# Patient Record
Sex: MALE | Race: WHITE | NOT HISPANIC OR LATINO | URBAN - METROPOLITAN AREA
[De-identification: names, ages, dates, MRNs, and addresses within clinical notes are randomized per-mention and may not be internally consistent; named-entity substitution may affect disease eponyms.]

---

## 2017-03-30 ENCOUNTER — OUTPATIENT (OUTPATIENT)
Dept: OUTPATIENT SERVICES | Facility: HOSPITAL | Age: 77
LOS: 1 days | End: 2017-03-30
Payer: MEDICARE

## 2017-03-30 ENCOUNTER — APPOINTMENT (OUTPATIENT)
Dept: CT IMAGING | Facility: CLINIC | Age: 77
End: 2017-03-30

## 2017-03-30 ENCOUNTER — APPOINTMENT (OUTPATIENT)
Dept: MRI IMAGING | Facility: CLINIC | Age: 77
End: 2017-03-30

## 2017-03-30 DIAGNOSIS — Z00.8 ENCOUNTER FOR OTHER GENERAL EXAMINATION: ICD-10-CM

## 2017-03-30 PROCEDURE — 71250 CT THORAX DX C-: CPT

## 2017-03-30 PROCEDURE — 74181 MRI ABDOMEN W/O CONTRAST: CPT

## 2017-03-30 PROCEDURE — 72195 MRI PELVIS W/O DYE: CPT

## 2017-06-13 ENCOUNTER — APPOINTMENT (OUTPATIENT)
Dept: ELECTROPHYSIOLOGY | Facility: CLINIC | Age: 77
End: 2017-06-13

## 2017-06-13 ENCOUNTER — CHART COPY (OUTPATIENT)
Age: 77
End: 2017-06-13

## 2017-06-13 VITALS
HEIGHT: 68 IN | BODY MASS INDEX: 32.13 KG/M2 | SYSTOLIC BLOOD PRESSURE: 125 MMHG | WEIGHT: 212 LBS | HEART RATE: 86 BPM | DIASTOLIC BLOOD PRESSURE: 83 MMHG

## 2017-07-12 ENCOUNTER — CHART COPY (OUTPATIENT)
Age: 77
End: 2017-07-12

## 2017-12-19 ENCOUNTER — APPOINTMENT (OUTPATIENT)
Dept: ELECTROPHYSIOLOGY | Facility: CLINIC | Age: 77
End: 2017-12-19
Payer: MEDICARE

## 2017-12-19 VITALS
DIASTOLIC BLOOD PRESSURE: 74 MMHG | WEIGHT: 212 LBS | HEIGHT: 68 IN | SYSTOLIC BLOOD PRESSURE: 139 MMHG | HEART RATE: 67 BPM | BODY MASS INDEX: 32.13 KG/M2

## 2017-12-19 PROCEDURE — 93290 INTERROG DEV EVAL ICPMS IP: CPT

## 2017-12-19 RX ORDER — ENALAPRIL MALEATE 10 MG/1
10 TABLET ORAL
Refills: 0 | Status: DISCONTINUED | COMMUNITY
End: 2017-12-19

## 2018-03-26 ENCOUNTER — APPOINTMENT (OUTPATIENT)
Dept: ELECTROPHYSIOLOGY | Facility: CLINIC | Age: 78
End: 2018-03-26
Payer: MEDICARE

## 2018-03-26 PROCEDURE — 93299: CPT

## 2018-03-26 PROCEDURE — 93298 REM INTERROG DEV EVAL SCRMS: CPT

## 2018-05-29 ENCOUNTER — OTHER (OUTPATIENT)
Age: 78
End: 2018-05-29

## 2018-06-26 ENCOUNTER — APPOINTMENT (OUTPATIENT)
Dept: ELECTROPHYSIOLOGY | Facility: CLINIC | Age: 78
End: 2018-06-26
Payer: MEDICARE

## 2018-06-26 VITALS
BODY MASS INDEX: 33.34 KG/M2 | HEART RATE: 61 BPM | HEIGHT: 68 IN | DIASTOLIC BLOOD PRESSURE: 77 MMHG | SYSTOLIC BLOOD PRESSURE: 143 MMHG | WEIGHT: 220 LBS

## 2018-06-26 DIAGNOSIS — Z95.818 PRESENCE OF OTHER CARDIAC IMPLANTS AND GRAFTS: ICD-10-CM

## 2018-06-26 PROCEDURE — 93285 PRGRMG DEV EVAL SCRMS IP: CPT

## 2018-07-03 ENCOUNTER — OUTPATIENT (OUTPATIENT)
Dept: OUTPATIENT SERVICES | Facility: HOSPITAL | Age: 78
LOS: 1 days | Discharge: HOME | End: 2018-07-03

## 2018-07-03 DIAGNOSIS — C64.9 MALIGNANT NEOPLASM OF UNSPECIFIED KIDNEY, EXCEPT RENAL PELVIS: ICD-10-CM

## 2018-07-04 ENCOUNTER — FORM ENCOUNTER (OUTPATIENT)
Age: 78
End: 2018-07-04

## 2018-07-05 ENCOUNTER — OUTPATIENT (OUTPATIENT)
Dept: OUTPATIENT SERVICES | Facility: HOSPITAL | Age: 78
LOS: 1 days | End: 2018-07-05
Payer: MEDICARE

## 2018-07-05 ENCOUNTER — APPOINTMENT (OUTPATIENT)
Dept: CT IMAGING | Facility: CLINIC | Age: 78
End: 2018-07-05
Payer: MEDICARE

## 2018-07-05 DIAGNOSIS — C64.9 MALIGNANT NEOPLASM OF UNSPECIFIED KIDNEY, EXCEPT RENAL PELVIS: ICD-10-CM

## 2018-07-05 PROCEDURE — 74176 CT ABD & PELVIS W/O CONTRAST: CPT | Mod: 26

## 2018-07-05 PROCEDURE — 74176 CT ABD & PELVIS W/O CONTRAST: CPT

## 2018-07-05 PROCEDURE — 71250 CT THORAX DX C-: CPT

## 2018-07-05 PROCEDURE — 71250 CT THORAX DX C-: CPT | Mod: 26

## 2018-07-10 ENCOUNTER — APPOINTMENT (OUTPATIENT)
Dept: HEMATOLOGY ONCOLOGY | Facility: CLINIC | Age: 78
End: 2018-07-10
Payer: MEDICARE

## 2018-07-10 VITALS
WEIGHT: 227.05 LBS | RESPIRATION RATE: 17 BRPM | DIASTOLIC BLOOD PRESSURE: 84 MMHG | OXYGEN SATURATION: 98 % | TEMPERATURE: 97.8 F | HEART RATE: 70 BPM | SYSTOLIC BLOOD PRESSURE: 130 MMHG | BODY MASS INDEX: 34.53 KG/M2

## 2018-07-10 PROCEDURE — 99214 OFFICE O/P EST MOD 30 MIN: CPT

## 2018-07-10 RX ORDER — ROSUVASTATIN CALCIUM 5 MG/1
5 TABLET, FILM COATED ORAL
Refills: 0 | Status: ACTIVE | COMMUNITY

## 2019-07-23 ENCOUNTER — FORM ENCOUNTER (OUTPATIENT)
Age: 79
End: 2019-07-23

## 2019-07-24 ENCOUNTER — OUTPATIENT (OUTPATIENT)
Dept: OUTPATIENT SERVICES | Facility: HOSPITAL | Age: 79
LOS: 1 days | End: 2019-07-24
Payer: MEDICARE

## 2019-07-24 ENCOUNTER — APPOINTMENT (OUTPATIENT)
Dept: CT IMAGING | Facility: CLINIC | Age: 79
End: 2019-07-24
Payer: MEDICARE

## 2019-07-24 DIAGNOSIS — Z00.8 ENCOUNTER FOR OTHER GENERAL EXAMINATION: ICD-10-CM

## 2019-07-24 PROCEDURE — 71250 CT THORAX DX C-: CPT

## 2019-07-24 PROCEDURE — 74176 CT ABD & PELVIS W/O CONTRAST: CPT | Mod: 26

## 2019-07-24 PROCEDURE — 74176 CT ABD & PELVIS W/O CONTRAST: CPT

## 2019-07-24 PROCEDURE — 71250 CT THORAX DX C-: CPT | Mod: 26

## 2019-07-25 NOTE — CONSULT LETTER
[Dear  ___] : Dear  [unfilled], [Courtesy Letter:] : I had the pleasure of seeing your patient, [unfilled], in my office today. [Please see my note below.] : Please see my note below. [Consult Closing:] : Thank you very much for allowing me to participate in the care of this patient.  If you have any questions, please do not hesitate to contact me. [Sincerely,] : Sincerely, [DrMiranda  ___] : Dr. GRIMES [DrMiranda ___] : Dr. GRIMES

## 2019-07-26 ENCOUNTER — OUTPATIENT (OUTPATIENT)
Dept: OUTPATIENT SERVICES | Facility: HOSPITAL | Age: 79
LOS: 1 days | Discharge: HOME | End: 2019-07-26

## 2019-07-26 DIAGNOSIS — C64.9 MALIGNANT NEOPLASM OF UNSPECIFIED KIDNEY, EXCEPT RENAL PELVIS: ICD-10-CM

## 2019-07-30 ENCOUNTER — APPOINTMENT (OUTPATIENT)
Dept: HEMATOLOGY ONCOLOGY | Facility: CLINIC | Age: 79
End: 2019-07-30
Payer: MEDICARE

## 2019-07-30 VITALS
OXYGEN SATURATION: 98 % | HEART RATE: 73 BPM | TEMPERATURE: 97.8 F | RESPIRATION RATE: 16 BRPM | DIASTOLIC BLOOD PRESSURE: 92 MMHG | SYSTOLIC BLOOD PRESSURE: 161 MMHG | BODY MASS INDEX: 34.93 KG/M2 | WEIGHT: 229.72 LBS

## 2019-07-30 PROCEDURE — 99214 OFFICE O/P EST MOD 30 MIN: CPT

## 2019-07-30 NOTE — REVIEW OF SYSTEMS
[Fatigue] : fatigue [Lower Ext Edema] : lower extremity edema [Joint Pain] : joint pain [Easy Bruising] : a tendency for easy bruising [Negative] : Gastrointestinal [Chills] : no chills [Night Sweats] : no night sweats [Fever] : no fever [Recent Change In Weight] : ~T no recent weight change [Chest Pain] : no chest pain [Cough] : no cough [Palpitations] : no palpitations [SOB on Exertion] : no shortness of breath during exertion [Incontinence] : no incontinence [Dysuria] : no dysuria [Muscle Pain] : no muscle pain [Dizziness] : no dizziness [Skin Rash] : no skin rash [Anxiety] : no anxiety [Depression] : no depression [Easy Bleeding] : no tendency for easy bleeding [FreeTextEntry5] : occ edema, minor [Muscle Weakness] : no muscle weakness [FreeTextEntry9] : hands [FreeTextEntry8] : flow is good, nocturia x 1 [de-identified] : No HA, no paresthesias

## 2019-07-30 NOTE — HISTORY OF PRESENT ILLNESS
[Disease: _____________________] : Disease: [unfilled] [T: ___] : T[unfilled] [N: ___] : N[unfilled] [M: ___] : M[unfilled] [AJCC Stage: ____] : AJCC Stage: [unfilled] [de-identified] : FG 2, clear cell [de-identified] : First seen by me October of 2013, this 74 year old man presented with gross hematuria and then underwent a CT scan.  He had a 25-pound weight loss before the diagnosis and he was found to be anemic.  A renal mass was found on the right side and he underwent resection complicated by a postoperative bleeding episode. This was a pathologic T3NXM0 tumor.  He was hospitalized from 08/27 via the emergency room until 09/17.  He was transfused 7 units of blood and he went back to the operating room on two occasions for removal of packing.  The pathology report is dated 08/29/13.  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  Tumor extended into the renal sinus fat and the adrenal gland was negative.  Margins were negative for invasive carcinoma.  No lymph nodes were submitted.  The tumor was pathologic T3aN0 and presumed M0.  \par \par 2/12/16....Feels well, no fatigue. Occasional pains in right side, present since the surgery. Notes a bulge in the area. Appetite is good, no weight loss. No dyspnea, cough/sputum. No blood in urine. \par \par 8/24/16...Feels well. Still  has some discomfort in the right flank area, present since the surgery. Intermittent tightness and uncomfortable sensation, mostly noted when he lies on his right side. Appetite good, no weight loss. No bone pains. No N/V/D/C. No respiratory issues. Has sleep apnea, uses CPAP. \par \par 7/10/18...Not seen in almost 2 years. Had recent CT. Feels well. No pains noted. Has chronic discomfort in the surgical area. Has some arthritic and intermittent low back pains for a few years. Appetite is normal, weight stable. No headaches, no dizziness, no balance issues, no falls. No visual disturbances. No leg edema except for small amount in AM. No bone pains. No cough. Some dyspnea when he bends over. Not with exertion.  [de-identified] : Here for follow up for T3 RCC 2013. Fells well. No headaches, no dizziness, no balance issues. Appetite is good, no weight loss. Minor fatigue. No hematuria. Has some muscle discomfort after the surgery. Some finer arthritis, no bone pains.

## 2019-07-30 NOTE — ASSESSMENT
[Palliative Care Plan] : not applicable at this time [FreeTextEntry1] : Mr. Dawson is seen in followup today. I first saw him in October of 2013. He was diagnosed with a T3 clear cell carcinoma at that time and underwent resection. He has not had any evidence of metastases over time, and he continues on surveillance. He feels well. There are no headaches or dizziness. There are no balance issues. His appetite is good and there's no weight loss. He has some minor fatigue. There is no hematuria. There are no bone pains.\par \par On physical examination, he appears well. There were no abnormal findings detected.\par \par CAT scan performed last week and went over the results. There is no evidence of metastases or recurrence. He is now almost 6 years from his diagnosis. We discussed recommended followup. Imaging studies are generally performed every year for 5 years or so, and then only performed based on symptoms and suspicions. We discussed the balance of discovering some recurrent early versus the potential adverse effects of radiation therapy from scans.\par \par I have asked to return to see me in one year's time, but I will not likely order a scan before his visit. All questions are answered the best of my ability and to his apparent satisfaction.

## 2019-10-07 VITALS — BODY MASS INDEX: 35.24 KG/M2 | WEIGHT: 225 LBS | SYSTOLIC BLOOD PRESSURE: 120 MMHG | DIASTOLIC BLOOD PRESSURE: 68 MMHG

## 2020-01-14 NOTE — RESULTS/DATA
[FreeTextEntry1] : EXAM: CT CHEST \par EXAM: CT ABDOMEN AND PELVIS \par PROCEDURE DATE: 07/24/2019 \par INTERPRETATION: CLINICAL INFORMATION: Renal cell cancer \par COMPARISON: Prior CT scan of the chest, abdomen and pelvis from 7/5/2018 \par PROCEDURE: \par CT of the Chest, Abdomen and Pelvis was performed without intravenous contrast. Intravenous contrast: None. \par Oral contrast: Positive contrast was administered. Sagittal and coronal reformats were performed. \par FINDINGS: \par CHEST: \par LUNGS AND LARGE AIRWAYS: Patent central airways. No pulmonary nodules. There is a 4 mm calcified granuloma in the right middle lobe. Mild atelectatic \par changes are present at the lung bases. \par PLEURA: No pleural effusion. \par VESSELS: Within normal limits. \par HEART: Heart size is normal. No pericardial effusion. There are vascular calcifications in the coronary arteries. \par MEDIASTINUM AND CHRISTA: No lymphadenopathy. \par CHEST WALL AND LOWER NECK: Within normal limits. \par ABDOMEN AND PELVIS: \par LIVER: Within normal limits. \par BILE DUCTS: Normal caliber. \par GALLBLADDER: There is a 5 mm stone in the gallbladder without gross \par inflammation. \par SPLEEN: Within normal limits. \par PANCREAS: Within normal limits. \par ADRENALS: There is a 1.3 x 1.1 cm left adrenal nodule measuring 30 Hounsfield units. This is nondiagnostic and has not significantly changed \par when compared with the prior study when measured in a similar fashion. KIDNEYS/URETERS: Patient is status post right-sided nephrectomy with \par postsurgical changes. No abnormal soft tissue is appreciated in the surgical bed. \par BLADDER: Within normal limits. \par REPRODUCTIVE ORGANS: Prostate gland is prominent in size. Please correlate clinically. \par BOWEL: No bowel obstruction. Appendix is not well visualized. No inflammatory changes are noted in the right lower quadrant. \par PERITONEUM: No ascites. \par VESSELS: Vascular calcifications. \par RETROPERITONEUM: No lymphadenopathy. \par ABDOMINAL WALL: There is a right inguinal hernia containing fat. There are surgical clips in the left inguinal region which may be due to prior hernia \par repair. Please correlate clinically. \par BONES: Mild degenerative changes of bone are present. \par IMPRESSION: \par Status post right nephrectomy with postsurgical changes for history of renal cell carcinoma. No gross evidence for recurrent or metastatic disease. \par 1.5 x 1.1 cm left adrenal nodule which is nondiagnostic and stable compared with the prior study when measured in a similar fashion. This is also stable \par when compared with older examination from 1/28/2015. \par Prominent prostate gland. Please correlate clinically. Right inguinal hernia containing fat. Additional findings as above. \par FELIX LEROY M.D., ATTENDING RADIOLOGIST \par This document has been electronically signed. Jul 24 2019 11:46AM  hair removal not indicated

## 2020-06-01 ENCOUNTER — OUTPATIENT (OUTPATIENT)
Dept: OUTPATIENT SERVICES | Facility: HOSPITAL | Age: 80
LOS: 1 days | Discharge: HOME | End: 2020-06-01

## 2020-06-01 DIAGNOSIS — C64.9 MALIGNANT NEOPLASM OF UNSPECIFIED KIDNEY, EXCEPT RENAL PELVIS: ICD-10-CM

## 2020-06-04 ENCOUNTER — APPOINTMENT (OUTPATIENT)
Dept: HEMATOLOGY ONCOLOGY | Facility: CLINIC | Age: 80
End: 2020-06-04
Payer: MEDICARE

## 2020-06-04 PROCEDURE — 99213 OFFICE O/P EST LOW 20 MIN: CPT | Mod: 95

## 2020-06-04 NOTE — ASSESSMENT
[Palliative Care Plan] : not applicable at this time [FreeTextEntry1] : Mr. Douglas is seen in the office in followup. He was first seen by me in October of 2013. A 25 pound weight loss before the diagnosis of a renal tumor he was anemic as well. At a right-sided renal mass which was 13 cm in size. He had postoperative bleeding episode. This was a T3a tumor. He had a clear-cell conventional renal cell carcinoma, Tanesha grade 2 tumor. A liver biopsy was done at that time of a lesion which revealed a bile duct hamartoma.\par \par He states he feels well. His appetite is good and his no weight loss. He has some minor arthritic complaints. There is no cough or shortness of breath. There are no headaches, balance issues, or dizziness. He has some mild fatigue. He is out and walking around the neighborhood. There are no major urinary issues. There is no hematuria.\par \par He appears well and he is independent and all of his activities of daily living.\par \par His last imaging studies were in July of 2019. He is now 7 years from the resection of his tumor which is a high risk tumor given the T3a status. I have made arrangements for him to have repeat imaging done at this time.\par \par All questions were answered to the best of my ability to his apparent satisfaction. I suggested that we meet once again in 6 months time.

## 2020-06-04 NOTE — REVIEW OF SYSTEMS
[Fatigue] : fatigue [Joint Pain] : joint pain [Easy Bruising] : a tendency for easy bruising [Negative] : Cardiovascular [Fever] : no fever [Recent Change In Weight] : ~T no recent weight change [Chest Pain] : no chest pain [Chills] : no chills [Lower Ext Edema] : no lower extremity edema [Palpitations] : no palpitations [Shortness Of Breath] : no shortness of breath [Cough] : no cough [SOB on Exertion] : no shortness of breath during exertion [Constipation] : no constipation [Abdominal Pain] : no abdominal pain [Diarrhea] : no diarrhea [Dysuria] : no dysuria [Joint Stiffness] : no joint stiffness [Dizziness] : no dizziness [Skin Rash] : no skin rash [Anxiety] : no anxiety [Depression] : no depression [Muscle Weakness] : no muscle weakness [Easy Bleeding] : no tendency for easy bleeding [FreeTextEntry9] : arthritis in fingers, no cramps [de-identified] : No HA, no paresthesias, no balance issues

## 2020-06-04 NOTE — HISTORY OF PRESENT ILLNESS
[Disease: _____________________] : Disease: [unfilled] [T: ___] : T[unfilled] [N: ___] : N[unfilled] [M: ___] : M[unfilled] [AJCC Stage: ____] : AJCC Stage: [unfilled] [Home] : at home, [unfilled] , at the time of the visit. [Medical Office: ([facility])___] : at the medical office located in  [Verbal consent obtained from patient] : the patient, [unfilled] [de-identified] : FG 2, clear cell [de-identified] : First seen by me October of 2013, this 74 year old man presented with gross hematuria and then underwent a CT scan.  He had a 25-pound weight loss before the diagnosis and he was found to be anemic.  A renal mass was found on the right side and he underwent resection complicated by a postoperative bleeding episode. This was a pathologic T3NXM0 tumor.  He was hospitalized from 08/27 via the emergency room until 09/17.  He was transfused 7 units of blood and he went back to the operating room on two occasions for removal of packing.  The pathology report is dated 08/29/13.  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  Tumor extended into the renal sinus fat and the adrenal gland was negative.  Margins were negative for invasive carcinoma.  No lymph nodes were submitted.  The tumor was pathologic T3aN0 and presumed M0.  \par \par 2/12/16....Feels well, no fatigue. Occasional pains in right side, present since the surgery. Notes a bulge in the area. Appetite is good, no weight loss. No dyspnea, cough/sputum. No blood in urine. \par \par 8/24/16...Feels well. Still  has some discomfort in the right flank area, present since the surgery. Intermittent tightness and uncomfortable sensation, mostly noted when he lies on his right side. Appetite good, no weight loss. No bone pains. No N/V/D/C. No respiratory issues. Has sleep apnea, uses CPAP. \par \par 7/10/18...Not seen in almost 2 years. Had recent CT. Feels well. No pains noted. Has chronic discomfort in the surgical area. Has some arthritic and intermittent low back pains for a few years. Appetite is normal, weight stable. No headaches, no dizziness, no balance issues, no falls. No visual disturbances. No leg edema except for small amount in AM. No bone pains. No cough. Some dyspnea when he bends over. Not with exertion. \par \par 7/30/19...Here for follow up for T3 RCC 2013. Fells well. No headaches, no dizziness, no balance issues. Appetite is good, no weight loss. Minor fatigue. No hematuria. Has some muscle discomfort after the surgery. Some finer arthritis, no bone pains.  [de-identified] : Verbal permission granted for telehealth services by the patient, Micky Douglas, on 6/4/20  at 10:24 AM. \par Feels well, follow up for RCC. Appetite is good, no weight loss. Minor arthritic complaints. No major pains. No cough, No SOB. No F/C. No headaches, no balance issues, no dizziness. Fatigue is mild. Walks around the neighborhood. Urine flow is good. NOcturia x 1. No blood in urine. No edema.

## 2020-06-10 LAB
ALBUMIN SERPL ELPH-MCNC: 4.4 G/DL
ALP BLD-CCNC: 60 U/L
ALT SERPL-CCNC: 17 U/L
ANION GAP SERPL CALC-SCNC: 12 MMOL/L
AST SERPL-CCNC: 28 U/L
BASOPHILS # BLD AUTO: 0.08 K/UL
BASOPHILS NFR BLD AUTO: 1 %
BILIRUB SERPL-MCNC: 0.4 MG/DL
BUN SERPL-MCNC: 27 MG/DL
CALCIUM SERPL-MCNC: 9.5 MG/DL
CHLORIDE SERPL-SCNC: 107 MMOL/L
CO2 SERPL-SCNC: 23 MMOL/L
CREAT SERPL-MCNC: 1.73 MG/DL
EOSINOPHIL # BLD AUTO: 0.4 K/UL
EOSINOPHIL NFR BLD AUTO: 4.9 %
GLUCOSE SERPL-MCNC: 81 MG/DL
HCT VFR BLD CALC: 50 %
HGB BLD-MCNC: 15.5 G/DL
IMM GRANULOCYTES NFR BLD AUTO: 0.6 %
LYMPHOCYTES # BLD AUTO: 1.76 K/UL
LYMPHOCYTES NFR BLD AUTO: 21.5 %
MAN DIFF?: NORMAL
MCHC RBC-ENTMCNC: 29.5 PG
MCHC RBC-ENTMCNC: 31 GM/DL
MCV RBC AUTO: 95.1 FL
MONOCYTES # BLD AUTO: 0.85 K/UL
MONOCYTES NFR BLD AUTO: 10.4 %
NEUTROPHILS # BLD AUTO: 5.05 K/UL
NEUTROPHILS NFR BLD AUTO: 61.6 %
PLATELET # BLD AUTO: 204 K/UL
POTASSIUM SERPL-SCNC: 4.6 MMOL/L
PROT SERPL-MCNC: 6.7 G/DL
RBC # BLD: 5.26 M/UL
RBC # FLD: 13.8 %
SODIUM SERPL-SCNC: 143 MMOL/L
WBC # FLD AUTO: 8.19 K/UL

## 2020-06-12 ENCOUNTER — APPOINTMENT (OUTPATIENT)
Dept: CT IMAGING | Facility: CLINIC | Age: 80
End: 2020-06-12
Payer: MEDICARE

## 2020-06-12 ENCOUNTER — OUTPATIENT (OUTPATIENT)
Dept: OUTPATIENT SERVICES | Facility: HOSPITAL | Age: 80
LOS: 1 days | End: 2020-06-12
Payer: MEDICARE

## 2020-06-12 DIAGNOSIS — Z00.8 ENCOUNTER FOR OTHER GENERAL EXAMINATION: ICD-10-CM

## 2020-06-12 DIAGNOSIS — C64.9 MALIGNANT NEOPLASM OF UNSPECIFIED KIDNEY, EXCEPT RENAL PELVIS: ICD-10-CM

## 2020-06-12 PROCEDURE — 71250 CT THORAX DX C-: CPT | Mod: 26

## 2020-06-12 PROCEDURE — 74176 CT ABD & PELVIS W/O CONTRAST: CPT | Mod: 26

## 2020-06-12 PROCEDURE — 71250 CT THORAX DX C-: CPT

## 2020-06-12 PROCEDURE — 74176 CT ABD & PELVIS W/O CONTRAST: CPT

## 2021-03-29 ENCOUNTER — TRANSCRIPTION ENCOUNTER (OUTPATIENT)
Age: 81
End: 2021-03-29

## 2021-03-29 ENCOUNTER — EMERGENCY (EMERGENCY)
Facility: HOSPITAL | Age: 81
LOS: 0 days | Discharge: HOME | End: 2021-03-29
Attending: EMERGENCY MEDICINE | Admitting: INTERNAL MEDICINE
Payer: MEDICARE

## 2021-03-29 VITALS
OXYGEN SATURATION: 97 % | DIASTOLIC BLOOD PRESSURE: 74 MMHG | HEART RATE: 72 BPM | RESPIRATION RATE: 18 BRPM | SYSTOLIC BLOOD PRESSURE: 145 MMHG

## 2021-03-29 VITALS
OXYGEN SATURATION: 96 % | HEIGHT: 68 IN | DIASTOLIC BLOOD PRESSURE: 83 MMHG | HEART RATE: 61 BPM | TEMPERATURE: 97 F | WEIGHT: 229.94 LBS | SYSTOLIC BLOOD PRESSURE: 181 MMHG

## 2021-03-29 DIAGNOSIS — N40.0 BENIGN PROSTATIC HYPERPLASIA WITHOUT LOWER URINARY TRACT SYMPTOMS: ICD-10-CM

## 2021-03-29 DIAGNOSIS — R07.9 CHEST PAIN, UNSPECIFIED: ICD-10-CM

## 2021-03-29 DIAGNOSIS — Z91.030 BEE ALLERGY STATUS: ICD-10-CM

## 2021-03-29 DIAGNOSIS — R07.89 OTHER CHEST PAIN: ICD-10-CM

## 2021-03-29 DIAGNOSIS — R10.13 EPIGASTRIC PAIN: ICD-10-CM

## 2021-03-29 DIAGNOSIS — E29.1 TESTICULAR HYPOFUNCTION: ICD-10-CM

## 2021-03-29 DIAGNOSIS — N18.30 CHRONIC KIDNEY DISEASE, STAGE 3 UNSPECIFIED: ICD-10-CM

## 2021-03-29 DIAGNOSIS — R60.9 EDEMA, UNSPECIFIED: ICD-10-CM

## 2021-03-29 DIAGNOSIS — K21.9 GASTRO-ESOPHAGEAL REFLUX DISEASE WITHOUT ESOPHAGITIS: ICD-10-CM

## 2021-03-29 DIAGNOSIS — I44.30 UNSPECIFIED ATRIOVENTRICULAR BLOCK: ICD-10-CM

## 2021-03-29 DIAGNOSIS — E78.5 HYPERLIPIDEMIA, UNSPECIFIED: ICD-10-CM

## 2021-03-29 DIAGNOSIS — Z82.49 FAMILY HISTORY OF ISCHEMIC HEART DISEASE AND OTHER DISEASES OF THE CIRCULATORY SYSTEM: ICD-10-CM

## 2021-03-29 DIAGNOSIS — Z90.5 ACQUIRED ABSENCE OF KIDNEY: ICD-10-CM

## 2021-03-29 DIAGNOSIS — I12.9 HYPERTENSIVE CHRONIC KIDNEY DISEASE WITH STAGE 1 THROUGH STAGE 4 CHRONIC KIDNEY DISEASE, OR UNSPECIFIED CHRONIC KIDNEY DISEASE: ICD-10-CM

## 2021-03-29 DIAGNOSIS — Z86.73 PERSONAL HISTORY OF TRANSIENT ISCHEMIC ATTACK (TIA), AND CEREBRAL INFARCTION WITHOUT RESIDUAL DEFICITS: ICD-10-CM

## 2021-03-29 LAB
ALBUMIN SERPL ELPH-MCNC: 4 G/DL — SIGNIFICANT CHANGE UP (ref 3.3–5)
ALP SERPL-CCNC: 67 U/L — SIGNIFICANT CHANGE UP (ref 40–120)
ALT FLD-CCNC: 19 U/L — SIGNIFICANT CHANGE UP (ref 12–78)
ANION GAP SERPL CALC-SCNC: 4 MMOL/L — LOW (ref 5–17)
APTT BLD: 38.4 SEC — HIGH (ref 27.5–35.5)
AST SERPL-CCNC: 15 U/L — SIGNIFICANT CHANGE UP (ref 15–37)
BASOPHILS # BLD AUTO: 0.07 K/UL — SIGNIFICANT CHANGE UP (ref 0–0.2)
BASOPHILS NFR BLD AUTO: 0.7 % — SIGNIFICANT CHANGE UP (ref 0–2)
BILIRUB SERPL-MCNC: 0.5 MG/DL — SIGNIFICANT CHANGE UP (ref 0.2–1.2)
BUN SERPL-MCNC: 30 MG/DL — HIGH (ref 7–23)
CALCIUM SERPL-MCNC: 10.1 MG/DL — SIGNIFICANT CHANGE UP (ref 8.5–10.1)
CHLORIDE SERPL-SCNC: 108 MMOL/L — SIGNIFICANT CHANGE UP (ref 96–108)
CO2 SERPL-SCNC: 28 MMOL/L — SIGNIFICANT CHANGE UP (ref 22–31)
CREAT SERPL-MCNC: 2.12 MG/DL — HIGH (ref 0.5–1.3)
EOSINOPHIL # BLD AUTO: 0.29 K/UL — SIGNIFICANT CHANGE UP (ref 0–0.5)
EOSINOPHIL NFR BLD AUTO: 2.8 % — SIGNIFICANT CHANGE UP (ref 0–6)
GLUCOSE SERPL-MCNC: 119 MG/DL — HIGH (ref 70–99)
HCT VFR BLD CALC: 49.3 % — SIGNIFICANT CHANGE UP (ref 39–50)
HGB BLD-MCNC: 15.8 G/DL — SIGNIFICANT CHANGE UP (ref 13–17)
IMM GRANULOCYTES NFR BLD AUTO: 0.6 % — SIGNIFICANT CHANGE UP (ref 0–1.5)
INR BLD: 1 RATIO — SIGNIFICANT CHANGE UP (ref 0.88–1.16)
LYMPHOCYTES # BLD AUTO: 1.32 K/UL — SIGNIFICANT CHANGE UP (ref 1–3.3)
LYMPHOCYTES # BLD AUTO: 12.9 % — LOW (ref 13–44)
MAGNESIUM SERPL-MCNC: 2.6 MG/DL — SIGNIFICANT CHANGE UP (ref 1.6–2.6)
MCHC RBC-ENTMCNC: 29.3 PG — SIGNIFICANT CHANGE UP (ref 27–34)
MCHC RBC-ENTMCNC: 32 GM/DL — SIGNIFICANT CHANGE UP (ref 32–36)
MCV RBC AUTO: 91.3 FL — SIGNIFICANT CHANGE UP (ref 80–100)
MONOCYTES # BLD AUTO: 0.95 K/UL — HIGH (ref 0–0.9)
MONOCYTES NFR BLD AUTO: 9.3 % — SIGNIFICANT CHANGE UP (ref 2–14)
NEUTROPHILS # BLD AUTO: 7.53 K/UL — HIGH (ref 1.8–7.4)
NEUTROPHILS NFR BLD AUTO: 73.7 % — SIGNIFICANT CHANGE UP (ref 43–77)
NT-PROBNP SERPL-SCNC: 52 PG/ML — SIGNIFICANT CHANGE UP (ref 0–450)
PLATELET # BLD AUTO: 218 K/UL — SIGNIFICANT CHANGE UP (ref 150–400)
POTASSIUM SERPL-MCNC: 4.3 MMOL/L — SIGNIFICANT CHANGE UP (ref 3.5–5.3)
POTASSIUM SERPL-SCNC: 4.3 MMOL/L — SIGNIFICANT CHANGE UP (ref 3.5–5.3)
PROT SERPL-MCNC: 7.7 GM/DL — SIGNIFICANT CHANGE UP (ref 6–8.3)
PROTHROM AB SERPL-ACNC: 11.7 SEC — SIGNIFICANT CHANGE UP (ref 10.6–13.6)
RAPID RVP RESULT: SIGNIFICANT CHANGE UP
RBC # BLD: 5.4 M/UL — SIGNIFICANT CHANGE UP (ref 4.2–5.8)
RBC # FLD: 13.9 % — SIGNIFICANT CHANGE UP (ref 10.3–14.5)
SARS-COV-2 RNA SPEC QL NAA+PROBE: SIGNIFICANT CHANGE UP
SODIUM SERPL-SCNC: 140 MMOL/L — SIGNIFICANT CHANGE UP (ref 135–145)
TROPONIN I SERPL-MCNC: <0.015 NG/ML — SIGNIFICANT CHANGE UP (ref 0.01–0.04)
TROPONIN I SERPL-MCNC: <0.015 NG/ML — SIGNIFICANT CHANGE UP (ref 0.01–0.04)
WBC # BLD: 10.22 K/UL — SIGNIFICANT CHANGE UP (ref 3.8–10.5)
WBC # FLD AUTO: 10.22 K/UL — SIGNIFICANT CHANGE UP (ref 3.8–10.5)

## 2021-03-29 PROCEDURE — 83735 ASSAY OF MAGNESIUM: CPT

## 2021-03-29 PROCEDURE — 96374 THER/PROPH/DIAG INJ IV PUSH: CPT

## 2021-03-29 PROCEDURE — 71045 X-RAY EXAM CHEST 1 VIEW: CPT

## 2021-03-29 PROCEDURE — 80053 COMPREHEN METABOLIC PANEL: CPT

## 2021-03-29 PROCEDURE — 85730 THROMBOPLASTIN TIME PARTIAL: CPT

## 2021-03-29 PROCEDURE — 36415 COLL VENOUS BLD VENIPUNCTURE: CPT

## 2021-03-29 PROCEDURE — 0225U NFCT DS DNA&RNA 21 SARSCOV2: CPT

## 2021-03-29 PROCEDURE — 85025 COMPLETE CBC W/AUTO DIFF WBC: CPT

## 2021-03-29 PROCEDURE — 84484 ASSAY OF TROPONIN QUANT: CPT

## 2021-03-29 PROCEDURE — 86850 RBC ANTIBODY SCREEN: CPT

## 2021-03-29 PROCEDURE — 85610 PROTHROMBIN TIME: CPT

## 2021-03-29 PROCEDURE — 83880 ASSAY OF NATRIURETIC PEPTIDE: CPT

## 2021-03-29 PROCEDURE — 99285 EMERGENCY DEPT VISIT HI MDM: CPT | Mod: 25

## 2021-03-29 PROCEDURE — 93005 ELECTROCARDIOGRAM TRACING: CPT

## 2021-03-29 PROCEDURE — 86900 BLOOD TYPING SEROLOGIC ABO: CPT

## 2021-03-29 PROCEDURE — 86901 BLOOD TYPING SEROLOGIC RH(D): CPT

## 2021-03-29 RX ORDER — FAMOTIDINE 10 MG/ML
20 INJECTION INTRAVENOUS ONCE
Refills: 0 | Status: COMPLETED | OUTPATIENT
Start: 2021-03-29 | End: 2021-03-29

## 2021-03-29 RX ORDER — LIDOCAINE 4 G/100G
5 CREAM TOPICAL ONCE
Refills: 0 | Status: COMPLETED | OUTPATIENT
Start: 2021-03-29 | End: 2021-03-29

## 2021-03-29 RX ORDER — PANTOPRAZOLE SODIUM 20 MG/1
40 TABLET, DELAYED RELEASE ORAL
Refills: 0 | Status: DISCONTINUED | OUTPATIENT
Start: 2021-03-29 | End: 2021-03-29

## 2021-03-29 RX ORDER — LOSARTAN POTASSIUM 100 MG/1
50 TABLET, FILM COATED ORAL ONCE
Refills: 0 | Status: DISCONTINUED | OUTPATIENT
Start: 2021-03-29 | End: 2021-03-29

## 2021-03-29 RX ORDER — ASPIRIN/CALCIUM CARB/MAGNESIUM 324 MG
325 TABLET ORAL ONCE
Refills: 0 | Status: DISCONTINUED | OUTPATIENT
Start: 2021-03-29 | End: 2021-03-29

## 2021-03-29 RX ORDER — SODIUM CHLORIDE 9 MG/ML
1000 INJECTION INTRAMUSCULAR; INTRAVENOUS; SUBCUTANEOUS ONCE
Refills: 0 | Status: COMPLETED | OUTPATIENT
Start: 2021-03-29 | End: 2021-03-29

## 2021-03-29 RX ADMIN — Medication 30 MILLILITER(S): at 06:05

## 2021-03-29 RX ADMIN — FAMOTIDINE 20 MILLIGRAM(S): 10 INJECTION INTRAVENOUS at 06:06

## 2021-03-29 RX ADMIN — LIDOCAINE 5 MILLILITER(S): 4 CREAM TOPICAL at 06:05

## 2021-03-29 RX ADMIN — PANTOPRAZOLE SODIUM 40 MILLIGRAM(S): 20 TABLET, DELAYED RELEASE ORAL at 10:28

## 2021-03-29 RX ADMIN — SODIUM CHLORIDE 1000 MILLILITER(S): 9 INJECTION INTRAMUSCULAR; INTRAVENOUS; SUBCUTANEOUS at 07:13

## 2021-03-29 RX ADMIN — SODIUM CHLORIDE 1000 MILLILITER(S): 9 INJECTION INTRAMUSCULAR; INTRAVENOUS; SUBCUTANEOUS at 06:45

## 2021-03-29 NOTE — ED ADULT NURSE REASSESSMENT NOTE - NS ED NURSE REASSESS COMMENT FT1
Pt discharged to home. Vitals taken and stable. Saline lock removed-no active bleeding. Discharge instructions given, pt told to follow up with primary and to return to ED if symptoms return. Pt ambulated to lobby with RN-Discharge complete.

## 2021-03-29 NOTE — CONSULT NOTE ADULT - ASSESSMENT
80 y.o. 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    1. "CP" with epigastric tenderness, Hx of GERD, neg EKG and troponins x2 - likely non cardiac in etiology   - add PPI   - f/u with GI and cardiology - Dr. Rogers and Dr. Vitale - outpt EGD and stress test - discussed with Dr. Vitale    2. HTN/HLD/BPH - cont current meds    3. CKD st III - no baseline present, Hx of right nephrectomy, avoid nephrotoxic meds, f/u with PMD    4. Hx of CVA - no neuro deficits, on DAPT, statin    Discussed with Dr. Vitale - f/u in the office for stress test  Discussed with Dr. Arias - ANTONIO from ED - medically stable

## 2021-03-29 NOTE — ED PROVIDER NOTE - PATIENT PORTAL LINK FT
You can access the FollowMyHealth Patient Portal offered by [facility] by registering at the following website: [email or web address]. By joining [facility]’s FollowMyHealth portal, you will also be able to view your health information using other applications (apps) compatible with our system.

## 2021-03-29 NOTE — ED PROVIDER NOTE - OBJECTIVE STATEMENT
79 y/o M with h/o HTN, HLD, CVA on  mg and Plavix p/w severe burning midsternal chest discomfort that has been constant since 0000 this AM.  Pt notes he ate a large meal from 1900 to 2200.  He initially attributed the pain to heartburn and took Mylanta without relief.  He states the pain is 6-7/10, constant, burning and doesn't radiate.  It isn't associated with SOB or diaphoresis.  No recent f/c/r, cough, sore throat, leg pain or swelling.  Pt denies recent travel. He has had 2 doses of the Covid vaccine.

## 2021-03-29 NOTE — DISCHARGE NOTE NURSING/CASE MANAGEMENT/SOCIAL WORK - PATIENT PORTAL LINK FT
You can access the FollowMyHealth Patient Portal offered by [facility] by registering at the following website: [email or web address]. By joining [facility]’s FollowMyHealth portal, you will also be able to view your health information using other applications (apps) compatible with our system.

## 2021-03-29 NOTE — ED PROVIDER NOTE - PROGRESS NOTE DETAILS
pt was admitted on previous shift. pt now stating he wants to go home. seen by hospitalist who states 2 negative trops no tele events no ekg changes and spoke with cardiology who states OK to d/c patient and see in office. I evaluated pt who is resting comfortably and states he wants to leave. states family is picking him up. offered pt to stay in hospital for further cardiac workup but would rather follow up. will dc. Sravan Arias M.D., Attending Physician

## 2021-03-29 NOTE — CONSULT NOTE ADULT - SUBJECTIVE AND OBJECTIVE BOX
80 y.o. male with PMHx of HTN, HLD, BPH, GERD, Right renal mass s/p nephrectomy , ischemic CVA  s/p ILR with no evidence of Afib p/w "CP" since last evening alleviated with maalox and pepcid in ED but still present.    ROS: + trace LE edema - resolves after ambulation in am  Other ROS reviewed and neg     PMHx: HTN, HLD, BPH, GERD, Right renal mass s/p nephrectomy , ischemic CVA  s/p ILR  PSHx: right nephrectomy, ILR  FHx: Mother  from aneurysm in old age, father - MI  SHx: no tobacco, ETOH, IVDA  Meds: see MR  All: ACEI - cough    Vital Signs Last 24 Hrs  T(C): 36.9 (29 Mar 2021 06:52), Max: 36.9 (29 Mar 2021 06:52)  T(F): 98.5 (29 Mar 2021 06:52), Max: 98.5 (29 Mar 2021 06:52)  HR: 60 (29 Mar 2021 06:52) (60 - 61)  BP: 165/67 (29 Mar 2021 06:52) (165/67 - 181/83)  BP(mean): 90 (29 Mar 2021 06:52) (90 - 108)  RR: 18 (29 Mar 2021 06:52) (18 - 18)  SpO2: 98% (29 Mar 2021 06:52) (96% - 98%)    CARDIAC MARKERS ( 29 Mar 2021 08:44 )  <0.015 ng/mL / x     / x     / x     / x      CARDIAC MARKERS ( 29 Mar 2021 05:31 )  <0.015 ng/mL / x     / x     / x     / x                            15.8   10.22 )-----------( 218      ( 29 Mar 2021 05:31 )             49.3     29 Mar 2021 05:31    140    |  108    |  30     ----------------------------<  119    4.3     |  28     |  2.12     Ca    10.1       29 Mar 2021 05:31  Mg     2.6       29 Mar 2021 05:31    TPro  7.7    /  Alb  4.0    /  TBili  0.5    /  DBili  x      /  AST  15     /  ALT  19     /  AlkPhos  67     29 Mar 2021 05:31    PT/INR - ( 29 Mar 2021 05:32 )   PT: 11.7 sec;   INR: 1.00 ratio       PTT - ( 29 Mar 2021 05:32 )  PTT:38.4 sec    LIVER FUNCTIONS - ( 29 Mar 2021 05:31 )  Alb: 4.0 g/dL / Pro: 7.7 gm/dL / ALK PHOS: 67 U/L / ALT: 19 U/L / AST: 15 U/L / GGT: x           PHYSICAL EXAM:    General: elderly male in no acute distress  Eyes: PERRLA, EOMI; conjunctiva and sclera clear  Head: Normocephalic; atraumatic  ENMT: No nasal discharge; airway clear  Neck: Supple; non tender; no masses  Respiratory: No wheezes, rales or rhonchi  Cardiovascular: Regular rate and rhythm. S1 and S2  Gastrointestinal: Soft non-distended abdomen with epigastric tenderness on exam; Normal bowel sounds  Genitourinary: No costovertebral angle tenderness  Extremities: Normal range of motion, No clubbing, cyanosis, + trace BL LE edema  Vascular: Peripheral pulses palpable 2+ bilaterally  Neurological: Alert and oriented x4  Skin: Warm and dry.   Musculoskeletal: Normal gait, tone, without deformities  Psychiatric: Cooperative and appropriate     80 y.o. male with PMHx of HTN, HLD, BPH, GERD, Right renal mass s/p nephrectomy , ischemic CVA  s/p ILR with no evidence of Afib p/w "CP" since last evening alleviated with maalox and pepcid in ED but still present.    ROS: + trace LE edema - resolves after ambulation in am  Other ROS reviewed and neg     PMHx: HTN, HLD, BPH, GERD, Right renal mass s/p nephrectomy , ischemic CVA  s/p ILR  PSHx: right nephrectomy, ILR  FHx: Mother  from aneurysm in old age, father - MI  SHx: no tobacco, ETOH, IVDA  Meds: ASA 81mg, plavix 75 mg, flomax 0.4 mg, crestor 20 mg, losartan 100 mg  All: ACEI - cough    Vital Signs Last 24 Hrs  T(C): 36.9 (29 Mar 2021 06:52), Max: 36.9 (29 Mar 2021 06:52)  T(F): 98.5 (29 Mar 2021 06:52), Max: 98.5 (29 Mar 2021 06:52)  HR: 60 (29 Mar 2021 06:52) (60 - 61)  BP: 165/67 (29 Mar 2021 06:52) (165/67 - 181/83)  BP(mean): 90 (29 Mar 2021 06:52) (90 - 108)  RR: 18 (29 Mar 2021 06:52) (18 - 18)  SpO2: 98% (29 Mar 2021 06:52) (96% - 98%)    CARDIAC MARKERS ( 29 Mar 2021 08:44 )  <0.015 ng/mL / x     / x     / x     / x      CARDIAC MARKERS ( 29 Mar 2021 05:31 )  <0.015 ng/mL / x     / x     / x     / x                            15.8   10.22 )-----------( 218      ( 29 Mar 2021 05:31 )             49.3     29 Mar 2021 05:31    140    |  108    |  30     ----------------------------<  119    4.3     |  28     |  2.12     Ca    10.1       29 Mar 2021 05:31  Mg     2.6       29 Mar 2021 05:31    TPro  7.7    /  Alb  4.0    /  TBili  0.5    /  DBili  x      /  AST  15     /  ALT  19     /  AlkPhos  67     29 Mar 2021 05:31    PT/INR - ( 29 Mar 2021 05:32 )   PT: 11.7 sec;   INR: 1.00 ratio       PTT - ( 29 Mar 2021 05:32 )  PTT:38.4 sec    LIVER FUNCTIONS - ( 29 Mar 2021 05:31 )  Alb: 4.0 g/dL / Pro: 7.7 gm/dL / ALK PHOS: 67 U/L / ALT: 19 U/L / AST: 15 U/L / GGT: x           PHYSICAL EXAM:    General: elderly male in no acute distress  Eyes: PERRLA, EOMI; conjunctiva and sclera clear  Head: Normocephalic; atraumatic  ENMT: No nasal discharge; airway clear  Neck: Supple; non tender; no masses  Respiratory: No wheezes, rales or rhonchi  Cardiovascular: Regular rate and rhythm. S1 and S2  Gastrointestinal: Soft non-distended abdomen with epigastric tenderness on exam; Normal bowel sounds  Genitourinary: No costovertebral angle tenderness  Extremities: Normal range of motion, No clubbing, cyanosis, + trace BL LE edema  Vascular: Peripheral pulses palpable 2+ bilaterally  Neurological: Alert and oriented x4  Skin: Warm and dry.   Musculoskeletal: Normal gait, tone, without deformities  Psychiatric: Cooperative and appropriate

## 2021-03-29 NOTE — ED ADULT TRIAGE NOTE - CHIEF COMPLAINT QUOTE
patient c/o nonradiating midsternal chest pain since midnight. hx GERD, took mylanta with no relief.  denies cardiac hx, denies SOB.  EKG in progress.

## 2021-03-29 NOTE — ED ADULT NURSE NOTE - OBJECTIVE STATEMENT
Pt presents to ER c/o midsternal chest pain/burning sensation. Onset of symptoms began at 0000 when pt attempted to go to sleep. Pt describes pain as burning. AO x 3 oriented to baseline, normal breathing pattern with no difficulty. Denies SOB

## 2021-03-29 NOTE — ED ADULT NURSE REASSESSMENT NOTE - NS ED NURSE REASSESS COMMENT FT1
Pt received alert and oriented-VSS, slightly hypertensive. Pt c/o of abdominal to midsternal chest discomfort, HOB elevated with some relief. Pt assisted OOB to recliner and given heating pads for abd. Pt offered water and food-refused at this time. IVF infusing. Second trop due after 8:00, pt currently admitted. Pt hoping to be discharged to home if second trop is negative. Pt swabbed for covid-pt has received both doses of vaccine. All needs addressed and safety maintained. Call bell in reach.

## 2021-03-29 NOTE — ED PROVIDER NOTE - CLINICAL SUMMARY MEDICAL DECISION MAKING FREE TEXT BOX
79 y/o M with h/o HTN, CVA, HLD and HEART score of 5 p/w chest pain. Will get EKG, troponin, CXR and admit for full cardiac workup

## 2021-03-29 NOTE — ED ADULT NURSE REASSESSMENT NOTE - NS ED NURSE REASSESS COMMENT FT1
Pt resting comfortably-given Protonix PO. Vitals taken and stable. Plan is for discharge from ED by ED doctor per Dr. Dobbs. Pt made aware. Discharge order and paper work pending

## 2021-03-29 NOTE — ED PROVIDER NOTE - CARE PLAN
Note done. Will call mom   Principal Discharge DX:	Chest pain, unspecified type  Secondary Diagnosis:	Epigastric pain

## 2021-06-17 LAB
BASOPHILS # BLD AUTO: 0.07 K/UL
BASOPHILS NFR BLD AUTO: 0.9 %
EOSINOPHIL # BLD AUTO: 0.37 K/UL
EOSINOPHIL NFR BLD AUTO: 4.6 %
HCT VFR BLD CALC: 52.1 %
HGB BLD-MCNC: 16.7 G/DL
IMM GRANULOCYTES NFR BLD AUTO: 0.5 %
LYMPHOCYTES # BLD AUTO: 1.43 K/UL
LYMPHOCYTES NFR BLD AUTO: 17.7 %
MAN DIFF?: NORMAL
MCHC RBC-ENTMCNC: 29.9 PG
MCHC RBC-ENTMCNC: 32.1 GM/DL
MCV RBC AUTO: 93.4 FL
MONOCYTES # BLD AUTO: 0.73 K/UL
MONOCYTES NFR BLD AUTO: 9 %
NEUTROPHILS # BLD AUTO: 5.44 K/UL
NEUTROPHILS NFR BLD AUTO: 67.3 %
PLATELET # BLD AUTO: 210 K/UL
RBC # BLD: 5.58 M/UL
RBC # FLD: 14.1 %
WBC # FLD AUTO: 8.08 K/UL

## 2021-06-18 LAB
ALBUMIN SERPL ELPH-MCNC: 4.4 G/DL
ALP BLD-CCNC: 69 U/L
ALT SERPL-CCNC: 12 U/L
ANION GAP SERPL CALC-SCNC: 20 MMOL/L
AST SERPL-CCNC: 18 U/L
BILIRUB SERPL-MCNC: 0.6 MG/DL
BUN SERPL-MCNC: 24 MG/DL
CALCIUM SERPL-MCNC: 10 MG/DL
CHLORIDE SERPL-SCNC: 106 MMOL/L
CO2 SERPL-SCNC: 17 MMOL/L
CREAT SERPL-MCNC: 1.56 MG/DL
GLUCOSE SERPL-MCNC: 57 MG/DL
LDH SERPL-CCNC: 351 U/L
POTASSIUM SERPL-SCNC: 4.5 MMOL/L
PROT SERPL-MCNC: 7.2 G/DL
SODIUM SERPL-SCNC: 144 MMOL/L

## 2021-06-25 ENCOUNTER — NON-APPOINTMENT (OUTPATIENT)
Age: 81
End: 2021-06-25

## 2021-06-25 LAB
ALBUMIN SERPL ELPH-MCNC: 4.5 G/DL
ALP BLD-CCNC: 69 U/L
ALT SERPL-CCNC: 9 U/L
ANION GAP SERPL CALC-SCNC: 14 MMOL/L
AST SERPL-CCNC: 14 U/L
BILIRUB SERPL-MCNC: 0.6 MG/DL
BUN SERPL-MCNC: 23 MG/DL
CALCIUM SERPL-MCNC: 10 MG/DL
CHLORIDE SERPL-SCNC: 107 MMOL/L
CO2 SERPL-SCNC: 21 MMOL/L
CREAT SERPL-MCNC: 1.7 MG/DL
GLUCOSE SERPL-MCNC: 37 MG/DL
POTASSIUM SERPL-SCNC: 4.5 MMOL/L
PROT SERPL-MCNC: 6.8 G/DL
SODIUM SERPL-SCNC: 141 MMOL/L

## 2021-07-12 ENCOUNTER — APPOINTMENT (OUTPATIENT)
Dept: CT IMAGING | Facility: CLINIC | Age: 81
End: 2021-07-12

## 2021-07-12 ENCOUNTER — OUTPATIENT (OUTPATIENT)
Dept: OUTPATIENT SERVICES | Facility: HOSPITAL | Age: 81
LOS: 1 days | End: 2021-07-12
Payer: MEDICARE

## 2021-07-12 DIAGNOSIS — C64.9 MALIGNANT NEOPLASM OF UNSPECIFIED KIDNEY, EXCEPT RENAL PELVIS: ICD-10-CM

## 2021-07-12 PROCEDURE — 71250 CT THORAX DX C-: CPT | Mod: 26,MH

## 2021-07-12 PROCEDURE — 71250 CT THORAX DX C-: CPT

## 2021-07-12 PROCEDURE — 74176 CT ABD & PELVIS W/O CONTRAST: CPT | Mod: 26,MH

## 2021-07-12 PROCEDURE — 74176 CT ABD & PELVIS W/O CONTRAST: CPT

## 2021-07-15 ENCOUNTER — NON-APPOINTMENT (OUTPATIENT)
Age: 81
End: 2021-07-15

## 2021-07-20 ENCOUNTER — OUTPATIENT (OUTPATIENT)
Dept: OUTPATIENT SERVICES | Facility: HOSPITAL | Age: 81
LOS: 1 days | Discharge: HOME | End: 2021-07-20

## 2021-07-20 DIAGNOSIS — C64.9 MALIGNANT NEOPLASM OF UNSPECIFIED KIDNEY, EXCEPT RENAL PELVIS: ICD-10-CM

## 2021-07-21 ENCOUNTER — APPOINTMENT (OUTPATIENT)
Dept: HEMATOLOGY ONCOLOGY | Facility: CLINIC | Age: 81
End: 2021-07-21
Payer: MEDICARE

## 2021-07-21 VITALS
SYSTOLIC BLOOD PRESSURE: 123 MMHG | WEIGHT: 234.79 LBS | OXYGEN SATURATION: 98 % | BODY MASS INDEX: 36.42 KG/M2 | HEIGHT: 67.17 IN | HEART RATE: 81 BPM | TEMPERATURE: 97.2 F | RESPIRATION RATE: 16 BRPM | DIASTOLIC BLOOD PRESSURE: 75 MMHG

## 2021-07-21 PROCEDURE — 99213 OFFICE O/P EST LOW 20 MIN: CPT

## 2021-07-21 NOTE — ASSESSMENT
[Palliative Care Plan] : not applicable at this time [FreeTextEntry1] : Micky Douglas  presents to the office for follow-up today.  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  He has not had any recurrence to date.  He states that he feels "good".  He has no pains other than some arthritic complaints, and these involve mostly the knees.  His appetite is good and there is no weight loss.  He has no cough or shortness of breath.  There are no headaches, dizziness, or balance issues.  There are no visual changes.  He occasionally notes edema at the end of the day when he takes his socks off.  There were no chest pains or chest pressure.  There were no palpitations.  He had his coronavirus vaccine.  He reports no fatigue.  On review of systems, he has some occasional urgency and occasional minor incontinence.  There is no hematuria.\par \par On physical examination, he appears in no acute distress.  His performance status is 1.  Is no palpable adenopathy present.  The chest is clear and the heart examination is normal.  There is no spinal column or chest wall tenderness to palpation or percussion.  There is no edema of the extremities.  The abdominal examination was normal.\par \par We went over the results of his CT scan.  This information was transmitted to him a few days ago when it was first reported.  He remains without any evidence of recurrence.  I will see him once again in 1 years time.  He should contact me if any symptoms develop in the meantime.  All questions were answered to the best of my ability and to his apparent satisfaction.

## 2021-07-21 NOTE — HISTORY OF PRESENT ILLNESS
[Disease: _____________________] : Disease: [unfilled] [T: ___] : T[unfilled] [N: ___] : N[unfilled] [M: ___] : M[unfilled] [AJCC Stage: ____] : AJCC Stage: [unfilled] [de-identified] : First seen by me October of 2013, this 74 year old man presented with gross hematuria and then underwent a CT scan.  He had a 25-pound weight loss before the diagnosis and he was found to be anemic.  A renal mass was found on the right side and he underwent resection complicated by a postoperative bleeding episode. This was a pathologic T3NXM0 tumor.  He was hospitalized from 08/27 via the emergency room until 09/17.  He was transfused 7 units of blood and he went back to the operating room on two occasions for removal of packing.  The pathology report is dated 08/29/13.  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  Tumor extended into the renal sinus fat and the adrenal gland was negative.  Margins were negative for invasive carcinoma.  No lymph nodes were submitted.  The tumor was pathologic T3aN0 and presumed M0.  \par \par 2/12/16....Feels well, no fatigue. Occasional pains in right side, present since the surgery. Notes a bulge in the area. Appetite is good, no weight loss. No dyspnea, cough/sputum. No blood in urine. \par \par 8/24/16...Feels well. Still  has some discomfort in the right flank area, present since the surgery. Intermittent tightness and uncomfortable sensation, mostly noted when he lies on his right side. Appetite good, no weight loss. No bone pains. No N/V/D/C. No respiratory issues. Has sleep apnea, uses CPAP. \par \par 7/10/18...Not seen in almost 2 years. Had recent CT. Feels well. No pains noted. Has chronic discomfort in the surgical area. Has some arthritic and intermittent low back pains for a few years. Appetite is normal, weight stable. No headaches, no dizziness, no balance issues, no falls. No visual disturbances. No leg edema except for small amount in AM. No bone pains. No cough. Some dyspnea when he bends over. Not with exertion. \par \par 7/30/19...Here for follow up for T3 RCC 2013. Fells well. No headaches, no dizziness, no balance issues. Appetite is good, no weight loss. Minor fatigue. No hematuria. Has some muscle discomfort after the surgery. Some finer arthritis, no bone pains. \par \par 6/4/20 - Verbal permission granted for telehealth services by the patient, Micky Douglas, on 6/4/20  at 10:24 AM. \par Feels well, follow up for RCC. Appetite is good, no weight loss. Minor arthritic complaints. No major pains. No cough, No SOB. No F/C. No headaches, no balance issues, no dizziness. Fatigue is mild. Walks around the neighborhood. Urine flow is good. NOcturia x 1. No blood in urine. No edema.  [de-identified] : FG 2, clear cell [de-identified] : 7/21/21 - 2013, pT3aN0, grade 2, on surveillance. Feels "good". No pains  other than some arthritic complaints mostly of the knees. Appetite is good, no weight loss. No cough, no CAMPOS. NO headaches, no dizziness, no balance issues. NO visual changes. Occ edema at end of the day. No chest pains, no chest pressure. No palpitations. had CV vaccine. No fatigue.

## 2021-07-21 NOTE — PHYSICAL EXAM
[Restricted in physically strenuous activity but ambulatory and able to carry out work of a light or sedentary nature] : Status 1- Restricted in physically strenuous activity but ambulatory and able to carry out work of a light or sedentary nature, e.g., light house work, office work [Obese] : obese [Normal] : affect appropriate [de-identified] : no edema

## 2021-07-21 NOTE — REVIEW OF SYSTEMS
[Lower Ext Edema] : lower extremity edema [Incontinence] : incontinence [Joint Pain] : joint pain [Easy Bruising] : a tendency for easy bruising [Negative] : Gastrointestinal [Fever] : no fever [Chills] : no chills [Fatigue] : no fatigue [Recent Change In Weight] : ~T no recent weight change [Chest Pain] : no chest pain [Palpitations] : no palpitations [Wheezing] : no wheezing [Cough] : no cough [SOB on Exertion] : no shortness of breath during exertion [Dysuria] : no dysuria [Muscle Pain] : no muscle pain [Skin Rash] : no skin rash [Dizziness] : no dizziness [Anxiety] : no anxiety [Depression] : no depression [Muscle Weakness] : no muscle weakness [Easy Bleeding] : no tendency for easy bleeding [FreeTextEntry8] : occ urgency, occ minor incontinence [FreeTextEntry9] : knees, no cramps [de-identified] : No HA, no paresthesias

## 2021-07-23 ENCOUNTER — APPOINTMENT (OUTPATIENT)
Dept: DERMATOLOGY | Facility: CLINIC | Age: 81
End: 2021-07-23

## 2021-08-16 ENCOUNTER — APPOINTMENT (OUTPATIENT)
Dept: DERMATOLOGY | Facility: CLINIC | Age: 81
End: 2021-08-16

## 2021-09-15 ENCOUNTER — APPOINTMENT (OUTPATIENT)
Dept: DERMATOLOGY | Facility: CLINIC | Age: 81
End: 2021-09-15

## 2021-09-17 ENCOUNTER — APPOINTMENT (OUTPATIENT)
Dept: DERMATOLOGY | Facility: CLINIC | Age: 81
End: 2021-09-17
Payer: MEDICARE

## 2021-09-17 DIAGNOSIS — B37.0 CANDIDAL STOMATITIS: ICD-10-CM

## 2021-09-17 PROCEDURE — 99203 OFFICE O/P NEW LOW 30 MIN: CPT

## 2021-09-17 RX ORDER — TRIAMCINOLONE ACETONIDE 1 MG/G
0.1 CREAM TOPICAL
Qty: 1 | Refills: 2 | Status: ACTIVE | COMMUNITY
Start: 2021-09-17 | End: 1900-01-01

## 2021-09-17 RX ORDER — KETOCONAZOLE 20 MG/G
2 CREAM TOPICAL
Qty: 1 | Refills: 2 | Status: ACTIVE | COMMUNITY
Start: 2021-09-17 | End: 1900-01-01

## 2021-09-17 NOTE — PHYSICAL EXAM
[FreeTextEntry3] : AAOx3, pleasant, NAD, no visual lymphadenopathy\par hair, scalp, face, nose, eyelids, ears, lips, oropharynx, neck, chest, abdomen, back, right arm, left arm, nails, and hands examined with all normal findings,\par pertinent findings include:\par \par cracking in corners of mouth

## 2021-09-17 NOTE — HISTORY OF PRESENT ILLNESS
[FreeTextEntry1] : rash [de-identified] : 81 year old with rash on corners of mouth. treating with OTC HC and antifungal. some response but recurs.

## 2021-09-17 NOTE — ASSESSMENT
[FreeTextEntry1] : perleche\par mix triamcinolone 0.1% cream with ketoconazole cream BID x2 weeks; SED\par education

## 2022-01-05 ENCOUNTER — APPOINTMENT (OUTPATIENT)
Dept: DERMATOLOGY | Facility: CLINIC | Age: 82
End: 2022-01-05
Payer: MEDICARE

## 2022-01-05 PROCEDURE — 99213 OFFICE O/P EST LOW 20 MIN: CPT

## 2022-01-05 NOTE — ASSESSMENT
[FreeTextEntry1] : Seb derm\par Education.\par Locoid solution bid.\par DHS-Zinc shampoo vs. Nizoral shampoo vs. Selsun blue.\par \par hx of angular cheilitis\par Using nizoral cream, doing well.\par Discussed vaseline to the region qhs.

## 2022-01-05 NOTE — PHYSICAL EXAM
[Alert] : alert [Oriented x 3] : ~L oriented x 3 [Well Nourished] : well nourished [FreeTextEntry3] : erythema, right > left lateral scalp.  minimal scale.

## 2022-01-05 NOTE — HISTORY OF PRESENT ILLNESS
[FreeTextEntry1] : Right scalp itching. [de-identified] : Present for months, but progressing, despite using Selsun blue shampoo.

## 2022-02-08 ENCOUNTER — APPOINTMENT (OUTPATIENT)
Dept: DERMATOLOGY | Facility: CLINIC | Age: 82
End: 2022-02-08

## 2022-04-22 ENCOUNTER — APPOINTMENT (OUTPATIENT)
Dept: NEUROLOGY | Facility: CLINIC | Age: 82
End: 2022-04-22
Payer: MEDICARE

## 2022-04-22 DIAGNOSIS — R29.898 OTHER SYMPTOMS AND SIGNS INVOLVING THE MUSCULOSKELETAL SYSTEM: ICD-10-CM

## 2022-04-22 DIAGNOSIS — Z86.73 PERSONAL HISTORY OF TRANSIENT ISCHEMIC ATTACK (TIA), AND CEREBRAL INFARCTION W/OUT RESIDUAL DEFICITS: ICD-10-CM

## 2022-04-22 PROCEDURE — 99204 OFFICE O/P NEW MOD 45 MIN: CPT

## 2022-04-22 NOTE — DISCUSSION/SUMMARY
[FreeTextEntry1] : 81-year-old M with PMHx of HLD, CVA in Dec 2, 2014, renal cell CA s/p right nephrectomy, presents with complaints of headaches X 4 months, had some TMJ like symptoms that resolved, no associated visual blurring or scalp dysesthesias, initial ESR ~ 50, repeat level 81, was treated with prednisone 20 mg daily, seen by rheumatologist Dr. Goldberg, repeat ESR ~ 81, HA intensity has improved from 5/10 - > 3/10, apparently most recent ESR was low, is on tapering dose prednisone. \par \par #Cephalgia; although not typical for GCA, cannot be excluded given to very high readings of ESR and some form of jaw claudication.\par \par -I agree with Dr. Goldberg that TA biopsy may not yield results as patient has been on prednisone for a while.\par -I would continue low-dose prednisone, have serial ESR's, to do them to determine long-term management\par - Patient is following up with Dr. Goldberg\par - Follow-up with me in 2 months, if headaches persist may consider prophylactic medication\par \par #History of prior CVA with worsening left leg weakness: although it could be residual / disuse, I would like to rule out acute CVA given multiple risk factors\par \par -I have recommended MRI of the brain to rule out stroke and MRA of the brain to rule out vasculitis/VBI\par -.Physical therapy for balance and gait training and left leg strengthening\par -Continue DAPT, statin for stroke prophylaxis\par

## 2022-04-22 NOTE — DATA REVIEWED
[de-identified] : 1/30/15:MRI brain: subacute foci of lacunar infarctions within right caudo-thalamic groove. Superimposed adjacent foci of remote lacunar infarction within the right caudate tail extending into the caudo-thalamic groove and into the right posterior putamen. Mild chronic microvascular ischemic change.\par MRA intracranial: Minimal atherosclerotic plaque within the right carotids siphon without stenosis or large vessel occlusion. Normal intracranial MRA. [de-identified] : EE/for/14; normal EEG in the awake state. No epileptiform discharge identified.\par  [de-identified] : 3/1/22: Labs ESR 81, creatinine 1.5, GFR 43.7, hemoglobin 12, hematocrit 39.9\par 1/26/2022: CT head: No acute intracranial pathology.  Mild chronic microvascular ischemic changes.  Small chronic infarcts in the right basal ganglia and right corona radiata [FreeTextEntry1] :  \par

## 2022-04-22 NOTE — HISTORY OF PRESENT ILLNESS
[FreeTextEntry1] : 81-year-old male with PMHx of HLD, CVA in December 2, 2014, renal cell CA status post right nephrectomy, presents today with complaints of headaches that have evolved over the past 4 months and stiffness and weakness left leg that has resulted in mildly unstable gait.\par \par Patient reports that about 4 months ago, he started having bifrontal headaches, he had bronchitis around the same time, was treated for that, headaches however persisted, he reports some TMJ like symptoms around that time, ESR was done was 50, he was started on prednisone 20 mg daily by his PMD, CT scan head done was unremarkable as well.  Patient was referred to rheumatologist Dr. Goldberg, repeat ESR was 81, he continued on prednisone continue to have headaches that were less intense ' describes it as though he has a cap on his head, the intensity of headaches has improved from 5/10-3/10, he describes it as constant discomfort, not associated with visual blurring, double vision, nausea, vomiting, photophobia.  Patient reports that his rheumatologist repeated ESR recently, it was noted to be low (report is not available), he has been tapered off prednisone is currently on 10 mg daily, rheumatologist feels suspicion for GCA is less.\par \par Patient also has another complaint, he reports stiffness and weakness in the left leg, he has had slight left leg residual weakness after the previous stroke, however, over the past 2 years it has become significantly worse, at times he needs to drag his leg while walking, he has had occasional fall, by and large he is fully functional.  Patient denies weakness, paresthesias of the other 3 extremities.

## 2022-04-22 NOTE — PHYSICAL EXAM
[General Appearance - In No Acute Distress] : in no acute distress [Oriented To Time, Place, And Person] : oriented to person, place, and time [Impaired Insight] : insight and judgment were intact [Mood] : the mood was normal [Person] : oriented to person [Place] : oriented to place [Time] : oriented to time [Registration Intact] : recent registration memory intact [Span Intact] : the attention span was normal [Naming Objects] : no difficulty naming common objects [Repeating Phrases] : no difficulty repeating a phrase [Fluency] : fluency intact [Comprehension] : comprehension intact [Cranial Nerves Optic (II)] : visual acuity intact bilaterally,  visual fields full to confrontation, pupils equal round and reactive to light [Cranial Nerves Oculomotor (III)] : extraocular motion intact [Cranial Nerves Trigeminal (V)] : facial sensation intact symmetrically [Cranial Nerves Vestibulocochlear (VIII)] : hearing was intact bilaterally [Cranial Nerves Glossopharyngeal (IX)] : tongue and palate midline [Cranial Nerves Accessory (XI - Cranial And Spinal)] : head turning and shoulder shrug symmetric [Cranial Nerves Hypoglossal (XII)] : there was no tongue deviation with protrusion [Flattening Of Nasolabial Fold On The Left] : flattening of the  nasolabial fold [Cranial Nerves Left Facial: House Grade ___(1-6)] : grade I (100%) facial nerve function [Motor Tone] : muscle tone was normal in all four extremities [No Muscle Atrophy] : normal bulk in all four extremities [2+] : Patella right 2+ [3+] : Patella left 3+ [1+] : Ankle jerk left 1+ [PERRL With Normal Accommodation] : pupils were equal in size, round, reactive to light, with normal accommodation [Extraocular Movements] : extraocular movements were intact [Full Visual Field] : full visual field [Outer Ear] : the ears and nose were normal in appearance [Hearing Threshold Finger Rub Not Ciales] : hearing was normal [Neck Cervical Mass (___cm)] : no neck mass was observed [Auscultation Breath Sounds / Voice Sounds] : lungs were clear to auscultation bilaterally [Heart Rate And Rhythm] : heart rate was normal and rhythm regular [Heart Sounds] : normal S1 and S2 [Arterial Pulses Carotid] : carotid pulses were normal with no bruits [Edema] : there was no peripheral edema [Abnormal Walk] : normal gait [Involuntary Movements] : no involuntary movements were seen [] : no rash [General Appearance - Alert] : alert [FreeTextEntry1] : No palpable, tender temporal arteries [Current Events] : adequate knowledge of current events [Cranial Nerves Facial (VII)] : face symmetrical [Motor Strength Upper Extremities Bilaterally] : strength was normal in both upper extremities [Motor Strength Lower Extremities Bilaterally] : strength was normal in both lower extremities [Motor Strength Wrists Left Weakness] : normal wrist strength [Hand Weakness Left] : normal hand  [Motor Strength Fingers Left Hand Weakness] : normal finger strength [Motor Strength Thumb Left Weakness] : normal thumb strength [Sensation Tactile Decrease] : light touch was intact [Romberg's Sign] : Romberg's sign was negtive [Tremor] : no tremor present [Dysdiadochokinesia Bilaterally] : not present [Coordination - Dysmetria Impaired Finger-to-Nose Bilateral] : not present [FreeTextEntry6] : Motor: No pronator drift, Moderate hypertonia with 5-/5 DF left lower extremity, other 3 extremity muscle groups 5/5 [FreeTextEntry8] : Gait: Mildly unstable gait, unable to walk tandem

## 2022-04-22 NOTE — REASON FOR VISIT
Quality 265: Biopsy Follow-Up: Biopsy results reviewed, communicated, tracked, and documented Detail Level: Detailed Quality 130: Documentation Of Current Medications In The Medical Record: Current Medications Documented Quality 111:Pneumonia Vaccination Status For Older Adults: Pneumococcal Vaccination not Administered or Previously Received, Reason not Otherwise Specified [Consultation] : a consultation visit [FreeTextEntry1] : For headaches and left leg weakness

## 2022-04-22 NOTE — REVIEW OF SYSTEMS
[Migraine Headache] : migraine headaches [Tension Headache] : tension-type headaches [Leg Weakness] : leg weakness [As Noted in HPI] : as noted in HPI [Negative] : Heme/Lymph

## 2022-04-22 NOTE — CONSULT LETTER
[Dear  ___] : Dear  [unfilled], [Consult Letter:] : I had the pleasure of evaluating your patient, [unfilled]. [DrMiranda  ___] : Dr. GRIMES

## 2022-05-02 ENCOUNTER — OUTPATIENT (OUTPATIENT)
Dept: OUTPATIENT SERVICES | Facility: HOSPITAL | Age: 82
LOS: 1 days | End: 2022-05-02
Payer: MEDICARE

## 2022-05-02 ENCOUNTER — APPOINTMENT (OUTPATIENT)
Dept: MRI IMAGING | Facility: CLINIC | Age: 82
End: 2022-05-02
Payer: MEDICARE

## 2022-05-02 DIAGNOSIS — R29.898 OTHER SYMPTOMS AND SIGNS INVOLVING THE MUSCULOSKELETAL SYSTEM: ICD-10-CM

## 2022-05-02 DIAGNOSIS — Z86.73 PERSONAL HISTORY OF TRANSIENT ISCHEMIC ATTACK (TIA), AND CEREBRAL INFARCTION WITHOUT RESIDUAL DEFICITS: ICD-10-CM

## 2022-05-02 PROCEDURE — 70551 MRI BRAIN STEM W/O DYE: CPT | Mod: MG

## 2022-05-02 PROCEDURE — 70551 MRI BRAIN STEM W/O DYE: CPT | Mod: 26,MG

## 2022-05-02 PROCEDURE — G1004: CPT

## 2022-05-02 PROCEDURE — 70544 MR ANGIOGRAPHY HEAD W/O DYE: CPT | Mod: 26,MG,59

## 2022-05-02 PROCEDURE — 70544 MR ANGIOGRAPHY HEAD W/O DYE: CPT | Mod: MG

## 2022-05-04 ENCOUNTER — NON-APPOINTMENT (OUTPATIENT)
Age: 82
End: 2022-05-04

## 2022-06-30 ENCOUNTER — APPOINTMENT (OUTPATIENT)
Dept: MRI IMAGING | Facility: CLINIC | Age: 82
End: 2022-06-30

## 2022-06-30 ENCOUNTER — OUTPATIENT (OUTPATIENT)
Dept: OUTPATIENT SERVICES | Facility: HOSPITAL | Age: 82
LOS: 1 days | End: 2022-06-30
Payer: MEDICARE

## 2022-06-30 DIAGNOSIS — M54.2 CERVICALGIA: ICD-10-CM

## 2022-06-30 PROCEDURE — 72141 MRI NECK SPINE W/O DYE: CPT | Mod: MH

## 2022-06-30 PROCEDURE — 72141 MRI NECK SPINE W/O DYE: CPT | Mod: 26,MH

## 2022-06-30 PROCEDURE — 72148 MRI LUMBAR SPINE W/O DYE: CPT | Mod: 26,MH

## 2022-06-30 PROCEDURE — 72148 MRI LUMBAR SPINE W/O DYE: CPT | Mod: MH

## 2022-07-21 ENCOUNTER — OUTPATIENT (OUTPATIENT)
Dept: OUTPATIENT SERVICES | Facility: HOSPITAL | Age: 82
LOS: 1 days | Discharge: HOME | End: 2022-07-21

## 2022-07-21 ENCOUNTER — APPOINTMENT (OUTPATIENT)
Dept: CT IMAGING | Facility: CLINIC | Age: 82
End: 2022-07-21

## 2022-07-21 ENCOUNTER — OUTPATIENT (OUTPATIENT)
Dept: OUTPATIENT SERVICES | Facility: HOSPITAL | Age: 82
LOS: 1 days | End: 2022-07-21
Payer: MEDICARE

## 2022-07-21 DIAGNOSIS — C64.9 MALIGNANT NEOPLASM OF UNSPECIFIED KIDNEY, EXCEPT RENAL PELVIS: ICD-10-CM

## 2022-07-21 PROCEDURE — 74176 CT ABD & PELVIS W/O CONTRAST: CPT

## 2022-07-21 PROCEDURE — 71250 CT THORAX DX C-: CPT | Mod: 26,MH

## 2022-07-21 PROCEDURE — 74176 CT ABD & PELVIS W/O CONTRAST: CPT | Mod: 26,MH

## 2022-07-21 PROCEDURE — 71250 CT THORAX DX C-: CPT

## 2022-07-26 ENCOUNTER — TRANSCRIPTION ENCOUNTER (OUTPATIENT)
Age: 82
End: 2022-07-26

## 2022-08-02 ENCOUNTER — NON-APPOINTMENT (OUTPATIENT)
Age: 82
End: 2022-08-02

## 2022-08-02 ENCOUNTER — RESULT REVIEW (OUTPATIENT)
Age: 82
End: 2022-08-02

## 2022-08-02 ENCOUNTER — APPOINTMENT (OUTPATIENT)
Dept: HEMATOLOGY ONCOLOGY | Facility: CLINIC | Age: 82
End: 2022-08-02

## 2022-08-02 VITALS
RESPIRATION RATE: 16 BRPM | DIASTOLIC BLOOD PRESSURE: 79 MMHG | HEIGHT: 67 IN | SYSTOLIC BLOOD PRESSURE: 134 MMHG | HEART RATE: 90 BPM | WEIGHT: 218.24 LBS | TEMPERATURE: 97.2 F | BODY MASS INDEX: 34.25 KG/M2 | OXYGEN SATURATION: 98 %

## 2022-08-02 LAB
ALBUMIN SERPL ELPH-MCNC: 4 G/DL
ALP BLD-CCNC: 82 U/L
ALT SERPL-CCNC: 8 U/L
ANION GAP SERPL CALC-SCNC: 13 MMOL/L
AST SERPL-CCNC: 11 U/L
BASOPHILS # BLD AUTO: 0.05 K/UL — SIGNIFICANT CHANGE UP (ref 0–0.2)
BASOPHILS NFR BLD AUTO: 0.5 % — SIGNIFICANT CHANGE UP (ref 0–2)
BILIRUB SERPL-MCNC: 0.4 MG/DL
BUN SERPL-MCNC: 27 MG/DL
CALCIUM SERPL-MCNC: 9.8 MG/DL
CHLORIDE SERPL-SCNC: 106 MMOL/L
CO2 SERPL-SCNC: 23 MMOL/L
CREAT SERPL-MCNC: 1.69 MG/DL
CRP SERPL-MCNC: 41 MG/L
EGFR: 41 ML/MIN/1.73M2
EOSINOPHIL # BLD AUTO: 0.31 K/UL — SIGNIFICANT CHANGE UP (ref 0–0.5)
EOSINOPHIL NFR BLD AUTO: 3.3 % — SIGNIFICANT CHANGE UP (ref 0–6)
FERRITIN SERPL-MCNC: 345 NG/ML
FOLATE SERPL-MCNC: 9.4 NG/ML
GLUCOSE SERPL-MCNC: 89 MG/DL
HCT VFR BLD CALC: 37.4 % — LOW (ref 39–50)
HGB BLD-MCNC: 11.7 G/DL — LOW (ref 13–17)
IMM GRANULOCYTES NFR BLD AUTO: 0.6 % — SIGNIFICANT CHANGE UP (ref 0–1.5)
IRON SATN MFR SERPL: 17 %
IRON SERPL-MCNC: 37 UG/DL
LDH SERPL-CCNC: 194 U/L
LYMPHOCYTES # BLD AUTO: 1.33 K/UL — SIGNIFICANT CHANGE UP (ref 1–3.3)
LYMPHOCYTES # BLD AUTO: 14.1 % — SIGNIFICANT CHANGE UP (ref 13–44)
MCHC RBC-ENTMCNC: 28.6 PG — SIGNIFICANT CHANGE UP (ref 27–34)
MCHC RBC-ENTMCNC: 31.3 G/DL — LOW (ref 32–36)
MCV RBC AUTO: 91.4 FL — SIGNIFICANT CHANGE UP (ref 80–100)
MONOCYTES # BLD AUTO: 0.88 K/UL — SIGNIFICANT CHANGE UP (ref 0–0.9)
MONOCYTES NFR BLD AUTO: 9.3 % — SIGNIFICANT CHANGE UP (ref 2–14)
NEUTROPHILS # BLD AUTO: 6.82 K/UL — SIGNIFICANT CHANGE UP (ref 1.8–7.4)
NEUTROPHILS NFR BLD AUTO: 72.2 % — SIGNIFICANT CHANGE UP (ref 43–77)
NRBC # BLD: 0 /100 WBCS — SIGNIFICANT CHANGE UP (ref 0–0)
PLATELET # BLD AUTO: 320 K/UL — SIGNIFICANT CHANGE UP (ref 150–400)
POTASSIUM SERPL-SCNC: 4.8 MMOL/L
PROT SERPL-MCNC: 6.8 G/DL
RBC # BLD: 4.09 M/UL — LOW (ref 4.2–5.8)
RBC # FLD: 14.5 % — SIGNIFICANT CHANGE UP (ref 10.3–14.5)
RETICS #: 69.9 K/UL — SIGNIFICANT CHANGE UP (ref 25–125)
RETICS/RBC NFR: 1.7 % — SIGNIFICANT CHANGE UP (ref 0.5–2.5)
SODIUM SERPL-SCNC: 141 MMOL/L
TIBC SERPL-MCNC: 223 UG/DL
UIBC SERPL-MCNC: 186 UG/DL
VIT B12 SERPL-MCNC: 272 PG/ML
WBC # BLD: 9.45 K/UL — SIGNIFICANT CHANGE UP (ref 3.8–10.5)
WBC # FLD AUTO: 9.45 K/UL — SIGNIFICANT CHANGE UP (ref 3.8–10.5)

## 2022-08-02 PROCEDURE — 99214 OFFICE O/P EST MOD 30 MIN: CPT

## 2022-08-02 RX ORDER — HYDROCODONE BITARTRATE AND ACETAMINOPHEN 5; 325 MG/1; MG/1
5-325 TABLET ORAL
Qty: 12 | Refills: 0 | Status: DISCONTINUED | COMMUNITY
Start: 2022-06-06

## 2022-08-02 RX ORDER — AMOXICILLIN 500 MG/1
500 CAPSULE ORAL
Qty: 21 | Refills: 0 | Status: DISCONTINUED | COMMUNITY
Start: 2022-06-06

## 2022-08-02 RX ORDER — HYDROCORTISONE BUTYRATE 1 MG/ML
0.1 SOLUTION TOPICAL
Qty: 1 | Refills: 2 | Status: DISCONTINUED | COMMUNITY
Start: 2022-01-05 | End: 2022-08-02

## 2022-08-02 NOTE — PHYSICAL EXAM
[Restricted in physically strenuous activity but ambulatory and able to carry out work of a light or sedentary nature] : Status 1- Restricted in physically strenuous activity but ambulatory and able to carry out work of a light or sedentary nature, e.g., light house work, office work [Normal] : affect appropriate [de-identified] : trace pitting edema at ankles

## 2022-08-02 NOTE — RESULTS/DATA
[FreeTextEntry1] : EXAM: [id number] - CT ABDOMEN AND PELVIS - ORDERED BY: RODGER DOMINIQUE\par EXAM: [id number] - CT CHEST - ORDERED BY: RODGER DOMINIQUE\par PROCEDURE DATE: 07/21/2022\par INTERPRETATION: CLINICAL INFORMATION: 82-year-old male patient with RCC on surveillance, rule out recurrence for renal cell cancer,\par \par COMPARISON: CT chest abdomen pelvis 7/12/2021. CT abdomen pelvis 1/21/2014, 8/24/2013. CT chest 8/27/2013\par \par CONTRAST/COMPLICATIONS:\par IV Contrast: NONE\par Oral Contrast: Smoothie Readi-Cat 2 (accession [id number]), NONE (accession [id number])\par Complications: None reported at time of study completion\par \par PROCEDURE:\par CT of the Chest, Abdomen and Pelvis was performed.\par Sagittal and coronal reformats were performed.\par \par FINDINGS:\par CHEST:\par LUNGS AND LARGE AIRWAYS: Patent central airways. No pulmonary nodules. Stable calcified granulomas bilaterally, the largest in the right upper lobe 5 mm.\par PLEURA: No pleural effusion.\par VESSELS: Normal caliber of the ascending thoracic aorta and pulmonary trunk, 3.9cm and 3.3 cm respectively. Coronary artery calcifications. Aortic root calcification.\par HEART: Heart size is normal. Trace pericardial fluid, stable from prior study.\par MEDIASTINUM AND CHRISTA: No lymphadenopathy. Normal esophagus.\par CHEST WALL AND LOWER NECK: Electronic cardiac device in left parasternal anterior location in the subcutaneous tissue of the chest wall.\par \par ABDOMEN AND PELVIS:\par LIVER: Within normal limits.\par BILE DUCTS: Normal caliber.\par GALLBLADDER: Contracted gallbladder with calcified calculi.\par SPLEEN: Within normal limits.\par PANCREAS: Within normal limits.\par ADRENALS: Stable left adrenal nodule at 1.6 x 1.6 cm, HU 26. The nodule measured 1.4 cm on prior study 1/21/2014.\par KIDNEYS/URETERS: Right nephrectomy. No mass in the surgical bed. Left kidney grossly within normal limits. Hypoattenuating lesion of 1.5 x 2.0 cm at the medial aspect of the mid polar kidney with HU 12 likely a cyst and similar to prior study in size. No hydronephrosis.\par \par BLADDER: Within normal limits.\par REPRODUCTIVE ORGANS: Mildly enlarged prostate measuring 4.7 x 5.2 x 5.9 cm.\par \par BOWEL: No bowel obstruction. Appendix is normal with 2 tiny appendicoliths.\par PERITONEUM: No ascites.\par VESSELS: Within normal limits.\par RETROPERITONEUM/LYMPH NODES: No lymphadenopathy.\par ABDOMINAL WALL: Postsurgical changes with clips near the left symphysis pubis likely from inguinal hernia repair. Lobulated soft tissue density near the left inner orifice of the inguinal canal measuring 2.4 x 3.1 cm is unchanged since 8/24/2013, likely post surgical changes.\par BONES: Sclerotic foci in the left lateral 4th and 7th rib and 1st right rib unchanged since CT chest 2013. No aggressive bony lesions. Degenerative changes in the sternoclavicular joint.\par \par IMPRESSION:\par No significant interval change when compared to prior study 7/12/2021.\par \par No evidence of local recurrence or metastatic disease following right nephrectomy for renal neoplasm.\par --- End of Report ---\par BRADY DIEHL MD; Attending Radiologist\par This document has been electronically signed. Jul 25 2022 2:17PM\par

## 2022-08-02 NOTE — REVIEW OF SYSTEMS
[Fatigue] : fatigue [SOB on Exertion] : shortness of breath during exertion [Constipation] : constipation [Incontinence] : incontinence [Joint Pain] : joint pain [Muscle Pain] : muscle pain [Anxiety] : anxiety [Muscle Weakness] : muscle weakness [Easy Bruising] : a tendency for easy bruising [Negative] : ENT [Fever] : no fever [Chills] : no chills [Night Sweats] : no night sweats [Recent Change In Weight] : ~T no recent weight change [Chest Pain] : no chest pain [Palpitations] : no palpitations [Lower Ext Edema] : no lower extremity edema [Wheezing] : no wheezing [Cough] : no cough [Abdominal Pain] : no abdominal pain [Vomiting] : no vomiting [Diarrhea] : no diarrhea [Dysuria] : no dysuria [Joint Stiffness] : no joint stiffness [Skin Rash] : no skin rash [Dizziness] : no dizziness [Depression] : no depression [Easy Bleeding] : no tendency for easy bleeding [FreeTextEntry3] : cataract [FreeTextEntry9] : in quadriceps and calves at times, no cramps, mild muscle weakness [de-identified] : HA, see HPI. No paresthesias

## 2022-08-02 NOTE — HISTORY OF PRESENT ILLNESS
[Disease: _____________________] : Disease: [unfilled] [T: ___] : T[unfilled] [N: ___] : N[unfilled] [M: ___] : M[unfilled] [AJCC Stage: ____] : AJCC Stage: [unfilled] [de-identified] : First seen by me October of 2013, this 74 year old man presented with gross hematuria and then underwent a CT scan.  He had a 25-pound weight loss before the diagnosis and he was found to be anemic.  A renal mass was found on the right side and he underwent resection complicated by a postoperative bleeding episode. This was a pathologic T3NXM0 tumor.  He was hospitalized from 08/27/13 via the emergency room until 09/17/13.  He was transfused 7 units of blood and he went back to the operating room on two occasions for removal of packing.  The pathology report is dated 08/29/13.  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  Tumor extended into the renal sinus fat and the adrenal gland was negative.  Margins were negative for invasive carcinoma.  No lymph nodes were submitted.  The tumor was pathologic T3aN0 and presumed M0.  \par \par 2/12/16....Feels well, no fatigue. Occasional pains in right side, present since the surgery. Notes a bulge in the area. Appetite is good, no weight loss. No dyspnea, cough/sputum. No blood in urine. \par \par 8/24/16...Feels well. Still  has some discomfort in the right flank area, present since the surgery. Intermittent tightness and uncomfortable sensation, mostly noted when he lies on his right side. Appetite good, no weight loss. No bone pains. No N/V/D/C. No respiratory issues. Has sleep apnea, uses CPAP. \par \par 7/10/18...Not seen in almost 2 years. Had recent CT. Feels well. No pains noted. Has chronic discomfort in the surgical area. Has some arthritic and intermittent low back pains for a few years. Appetite is normal, weight stable. No headaches, no dizziness, no balance issues, no falls. No visual disturbances. No leg edema except for small amount in AM. No bone pains. No cough. Some dyspnea when he bends over. Not with exertion. \par \par 7/30/19...Here for follow up for T3 RCC 2013. Fells well. No headaches, no dizziness, no balance issues. Appetite is good, no weight loss. Minor fatigue. No hematuria. Has some muscle discomfort after the surgery. Some finer arthritis, no bone pains. \par \par 6/4/20 - Verbal permission granted for telehealth services by the patient, Micky Douglas, on 6/4/20  at 10:24 AM. \par Feels well, follow up for RCC. Appetite is good, no weight loss. Minor arthritic complaints. No major pains. No cough, No SOB. No F/C. No headaches, no balance issues, no dizziness. Fatigue is mild. Walks around the neighborhood. Urine flow is good. NOcturia x 1. No blood in urine. No edema. \par \par 7/21/21 - 2013, pT3aN0, grade 2, on surveillance. Feels "good". No pains  other than some arthritic complaints mostly of the knees. Appetite is good, no weight loss. No cough, no CAMPOS. NO headaches, no dizziness, no balance issues. NO visual changes. Occ edema at end of the day. No chest pains, no chest pressure. No palpitations. had CV vaccine. No fatigue.  [de-identified] : FG 2, clear cell [de-identified] : 8/2/22...2013, pT3aN0, grade 2, on surveillance. feels "okay". IN January, developed a "persistent" mild headache, saw PCP. He also had aches and weakness in quadriceps, with fatigue. He had a high ESR, 50-80, started prednisone as there was a wait to see a rheumatologist.  He had a head CT scan, was negative. Still has the headaches, rheum though that TA was unlikely and prednisone was stopped, Saw a neurologist, had MRI/MRA of brain. He says it was inconclusive, Had a second opinion from another  rheumatologist. ESR was 110. Had MR of lumbar and cervical spine and temporal sonogram.  On comprehensive metabolic panel his glucose was 85.  The BUN was 19 with a creatinine of 1.6.  Electrolytes were normal.  The calcium was 9.6.  Protein and albumin were normal.  Alkaline phosphatase was normal at 68.  AST and AST were low.  He was checked for Anaplasma which was negative.  Serum protein electrophoresis was performed and was found to be consistent with an acute inflammatory pattern.  There were no monoclonal proteins detected.  Kappa lambda light chains had a ratio of 1.04.  Creatinine kinase was normal.  Lyme titers were negative.  An HAYDEE was low positive as screening.  An antinuclear antibody was 1-40 titer which is borderline.  Hepatitis serology was negative.  Rheumatoid factor was normal.  C-reactive protein was notably elevated at 54.5.  Marie–1 antibody and SM and SM/RNP antibodies were negative.  Sjogren studies were negative.  Lyme titers were negative.  Babesia titers were negative.  His white blood cell count was 9.3 with a hemoglobin value of 11.8 g and hematocrit of 34.5%.  The MCV and MCH were normal.  The platelet count was 400,000.  The differential was normal.  The ESR was 110.  These were from June 22.\par No cough. has CAMPOS for years without any clear etiology, has stress echo, PFTs. Recently had a carotid sono, no issues. The headaches are mild, top of head, radiating to back of skull, like a scalp aches. It was better on the prednisone. (20 mg). Had fever knee pains on prednisone. No visual abnormalities, no N/V,, no photophobia, scotomata. No N/V/D, mild constipation present. Appetite is good, no weight loss of note. NO night sweats. No edema. No chest pain/pressure/palpitations. Urine flow is good, has OAB, nocturia x 1, some urgency, minor incontinence. NO blood, no dysuria. More anemic.. Has fatigue, present for last year. Back pain when standing for a time, bending forwards, present  for years. \par

## 2022-08-02 NOTE — ASSESSMENT
[Palliative Care Plan] : not applicable at this time [FreeTextEntry1] : Micky Douglas is seen in the office today in follow-up.  In 2013 he had a pathologic T3a grade 2 renal cell carcinoma resected.  In January this year he developed a "persistent" mild headache.  He saw his primary care doctor.  The headache extends over the dome of the skull and towards the temporal regions.  He also had aches and weakness in his quadriceps with fatigue.  He was found to have a high sedimentation rate on 1 occasion in the 50s and another occasion in the 80s.  He was started on prednisone as there was a wait to see a rheumatologist.  The diagnosis was the possibility of temporal arteritis.  He had a head CT scan which was negative.  He continues to have the headaches.  He saw a rheumatologist and they thought that temporal arteritis was unlikely and prednisone was stopped.  He was then seen by a neurologist and an MRI and MRA of the brain was performed which she said did not show any abnormality.  He went to a second rheumatologist for another opinion.  His sedimentation rate was found to be 110.  The CRP was 57.  An MRI of the lumbar and cervical spine was performed as well as a temporal sonogram.  On the comprehensive metabolic panel, and his glucose was 85.  The BUN was 19 with a creatinine of 1.6.  Electrolytes were normal as was the calcium.  There was no protein gap.  The alkaline phosphatase was normal and the transaminases were low.  He was checked for Anaplasma which was negative.  An SPEP was performed and found to be consistent with an acute inflammatory pattern.  There were no monoclonal proteins detected.  Creatinine kinase was normal.  Lyme titers were negative.  An HAYDEE was low positive as a screening test.  An antinuclear antibody was 1-40.  Hepatitis serology was negative.  Rheumatoid factor was negative.  The C-reactive protein was notably elevated at 54.5.  Marie–1 antibody as well as SM and SM/RNP antibodies were negative.  Sjogren studies were negative.  Lyme titers were negative.  Babesia titers were negative.  His white blood cell count was 9.3 with a hemoglobin value of 11.8 and hematocrit of 34.5%.  He usually has normal to higher hemoglobin values.  These labs were from June 22.\par \par He has no cough.  He has shortness of breath for many years without any clear etiology.  He had a stress echo and pulmonary function test.  He recently had a carotid sonogram and he says no issues were noted.  Headaches are mild.  They are in the top of the head and radiated to the back of the skull and he says is more like a scalp ache.  It was improved on prednisone as was many of his other aches and pains.  There were no visual abnormalities no nausea or vomiting and no photophobia or scotomata.  There is no nausea vomiting or diarrhea.  He has mild constipation.  His appetite has been good and there is no weight loss of note.  There are no night sweats.  He has no edema.  There is no chest pain chest pressure or palpitations.  He has an overactive bladder and daytime frequency.  Nocturia occurs once.  He does have some urgency with minor incontinence.  There is no hematuria dysuria.  He does have some fatigue which has been present for the last year.  He has back pain when standing for considerable time and when he is leaning forward doing something.  This is been present for years.  He has some muscle pain in the quadriceps as well as the calves at times.  These are not cramps.  He feels he has some mild muscle weakness.  He does have some anxiety as a result of the symptoms.\par \par On physical examination, he appears well.  His performance status is 1.  The HEENT examination is normal.  There is no palpable adenopathy in the neck region.  The lungs are clear and the heart examination is normal.  There is trace pitting edema at the ankles the abdominal examination is normal.  There were no palpable lymph nodes present.  There is no spinal column or chest wall tenderness to palpation or percussion.  Muscle power is normal at the pelvic girdle.  There are no abnormal reflexes.\par \par Today's CBC revealed a white blood cell count of 9.45 with a hemoglobin value of 11.7 g.  The hematocrit was 37.4%.  In June 2021, his hemoglobin was 16.7.  The reticulocyte count was 1.7% for an absolute value of 70,000.\par \par Peripheral blood smear–normochromic and normocytic red blood cells.  No fragmentation.  No teardrops or elliptocytes.  Platelets are abundant and well granulated.  White blood cell differential appears normal.  There were no immature white blood cells present.  There were no nucleated red blood cells noted.  The anemia is likely secondary to the inflammatory state which remains of unclear etiology at this time.  There is no evidence of recurrent/metastatic renal cell carcinoma\par \par All questions were answered to the best my ability and to his apparent satisfaction.

## 2022-08-03 LAB — ERYTHROCYTE [SEDIMENTATION RATE] IN BLOOD: 87 MM/HR — HIGH (ref 0–20)

## 2022-08-26 ENCOUNTER — OUTPATIENT (OUTPATIENT)
Dept: OUTPATIENT SERVICES | Facility: HOSPITAL | Age: 82
LOS: 1 days | End: 2022-08-26
Payer: MEDICARE

## 2022-08-26 ENCOUNTER — APPOINTMENT (OUTPATIENT)
Dept: VASCULAR SURGERY | Facility: CLINIC | Age: 82
End: 2022-08-26

## 2022-08-26 ENCOUNTER — RESULT REVIEW (OUTPATIENT)
Age: 82
End: 2022-08-26

## 2022-08-26 VITALS
HEIGHT: 67 IN | WEIGHT: 212 LBS | OXYGEN SATURATION: 98 % | SYSTOLIC BLOOD PRESSURE: 155 MMHG | DIASTOLIC BLOOD PRESSURE: 84 MMHG | BODY MASS INDEX: 33.27 KG/M2 | HEART RATE: 82 BPM

## 2022-08-26 VITALS
SYSTOLIC BLOOD PRESSURE: 157 MMHG | HEART RATE: 82 BPM | DIASTOLIC BLOOD PRESSURE: 73 MMHG | OXYGEN SATURATION: 98 % | TEMPERATURE: 98 F | RESPIRATION RATE: 18 BRPM | WEIGHT: 216.05 LBS | HEIGHT: 67 IN

## 2022-08-26 DIAGNOSIS — Z98.890 OTHER SPECIFIED POSTPROCEDURAL STATES: Chronic | ICD-10-CM

## 2022-08-26 DIAGNOSIS — M31.6 OTHER GIANT CELL ARTERITIS: ICD-10-CM

## 2022-08-26 DIAGNOSIS — H26.9 UNSPECIFIED CATARACT: Chronic | ICD-10-CM

## 2022-08-26 DIAGNOSIS — Z90.5 ACQUIRED ABSENCE OF KIDNEY: Chronic | ICD-10-CM

## 2022-08-26 DIAGNOSIS — Z01.818 ENCOUNTER FOR OTHER PREPROCEDURAL EXAMINATION: ICD-10-CM

## 2022-08-26 LAB
ANION GAP SERPL CALC-SCNC: 5 MMOL/L — SIGNIFICANT CHANGE UP (ref 5–17)
APTT BLD: 36.2 SEC — HIGH (ref 27.5–35.5)
BASOPHILS # BLD AUTO: 0.08 K/UL — SIGNIFICANT CHANGE UP (ref 0–0.2)
BASOPHILS NFR BLD AUTO: 0.9 % — SIGNIFICANT CHANGE UP (ref 0–2)
BUN SERPL-MCNC: 23 MG/DL — SIGNIFICANT CHANGE UP (ref 7–23)
CALCIUM SERPL-MCNC: 9.7 MG/DL — SIGNIFICANT CHANGE UP (ref 8.5–10.1)
CHLORIDE SERPL-SCNC: 110 MMOL/L — HIGH (ref 96–108)
CO2 SERPL-SCNC: 27 MMOL/L — SIGNIFICANT CHANGE UP (ref 22–31)
CREAT SERPL-MCNC: 1.58 MG/DL — HIGH (ref 0.5–1.3)
EGFR: 44 ML/MIN/1.73M2 — LOW
EOSINOPHIL # BLD AUTO: 0.29 K/UL — SIGNIFICANT CHANGE UP (ref 0–0.5)
EOSINOPHIL NFR BLD AUTO: 3.3 % — SIGNIFICANT CHANGE UP (ref 0–6)
GLUCOSE SERPL-MCNC: 161 MG/DL — HIGH (ref 70–99)
HCT VFR BLD CALC: 39.9 % — SIGNIFICANT CHANGE UP (ref 39–50)
HGB BLD-MCNC: 12.5 G/DL — LOW (ref 13–17)
IMM GRANULOCYTES NFR BLD AUTO: 0.3 % — SIGNIFICANT CHANGE UP (ref 0–1.5)
INR BLD: 1.11 RATIO — SIGNIFICANT CHANGE UP (ref 0.88–1.16)
LYMPHOCYTES # BLD AUTO: 1.4 K/UL — SIGNIFICANT CHANGE UP (ref 1–3.3)
LYMPHOCYTES # BLD AUTO: 16.1 % — SIGNIFICANT CHANGE UP (ref 13–44)
MCHC RBC-ENTMCNC: 28.2 PG — SIGNIFICANT CHANGE UP (ref 27–34)
MCHC RBC-ENTMCNC: 31.3 GM/DL — LOW (ref 32–36)
MCV RBC AUTO: 90.1 FL — SIGNIFICANT CHANGE UP (ref 80–100)
MONOCYTES # BLD AUTO: 0.65 K/UL — SIGNIFICANT CHANGE UP (ref 0–0.9)
MONOCYTES NFR BLD AUTO: 7.5 % — SIGNIFICANT CHANGE UP (ref 2–14)
NEUTROPHILS # BLD AUTO: 6.25 K/UL — SIGNIFICANT CHANGE UP (ref 1.8–7.4)
NEUTROPHILS NFR BLD AUTO: 71.9 % — SIGNIFICANT CHANGE UP (ref 43–77)
PLATELET # BLD AUTO: 310 K/UL — SIGNIFICANT CHANGE UP (ref 150–400)
POTASSIUM SERPL-MCNC: 4.3 MMOL/L — SIGNIFICANT CHANGE UP (ref 3.5–5.3)
POTASSIUM SERPL-SCNC: 4.3 MMOL/L — SIGNIFICANT CHANGE UP (ref 3.5–5.3)
PROTHROM AB SERPL-ACNC: 12.9 SEC — SIGNIFICANT CHANGE UP (ref 10.5–13.4)
RBC # BLD: 4.43 M/UL — SIGNIFICANT CHANGE UP (ref 4.2–5.8)
RBC # FLD: 14.7 % — HIGH (ref 10.3–14.5)
SODIUM SERPL-SCNC: 142 MMOL/L — SIGNIFICANT CHANGE UP (ref 135–145)
WBC # BLD: 8.7 K/UL — SIGNIFICANT CHANGE UP (ref 3.8–10.5)
WBC # FLD AUTO: 8.7 K/UL — SIGNIFICANT CHANGE UP (ref 3.8–10.5)

## 2022-08-26 PROCEDURE — 85025 COMPLETE CBC W/AUTO DIFF WBC: CPT

## 2022-08-26 PROCEDURE — 86901 BLOOD TYPING SEROLOGIC RH(D): CPT

## 2022-08-26 PROCEDURE — 71046 X-RAY EXAM CHEST 2 VIEWS: CPT | Mod: 26

## 2022-08-26 PROCEDURE — 85730 THROMBOPLASTIN TIME PARTIAL: CPT

## 2022-08-26 PROCEDURE — 36415 COLL VENOUS BLD VENIPUNCTURE: CPT

## 2022-08-26 PROCEDURE — 80048 BASIC METABOLIC PNL TOTAL CA: CPT

## 2022-08-26 PROCEDURE — 99214 OFFICE O/P EST MOD 30 MIN: CPT | Mod: 25

## 2022-08-26 PROCEDURE — 99203 OFFICE O/P NEW LOW 30 MIN: CPT

## 2022-08-26 PROCEDURE — 93010 ELECTROCARDIOGRAM REPORT: CPT

## 2022-08-26 PROCEDURE — 93005 ELECTROCARDIOGRAM TRACING: CPT

## 2022-08-26 PROCEDURE — 86850 RBC ANTIBODY SCREEN: CPT

## 2022-08-26 PROCEDURE — 71046 X-RAY EXAM CHEST 2 VIEWS: CPT

## 2022-08-26 PROCEDURE — 85610 PROTHROMBIN TIME: CPT

## 2022-08-26 PROCEDURE — 86900 BLOOD TYPING SEROLOGIC ABO: CPT

## 2022-08-26 PROCEDURE — 93880 EXTRACRANIAL BILAT STUDY: CPT

## 2022-08-26 NOTE — H&P PST ADULT - HISTORY OF PRESENT ILLNESS
82 y.o  82 y.o WD, WN obese male presents to PST with hx of headaches, leg weakness and gait disturbance since 1/2022. He has extensive workup with neurology without significant findings. He was referred to vascular to rule out Giant cell arteritis. He is now scheduled for Bilateral Temporal Artery Biopsy

## 2022-08-26 NOTE — H&P PST ADULT - NSICDXPASTSURGICALHX_GEN_ALL_CORE_FT
PAST SURGICAL HISTORY:  Cataract, left eye     H/O colonoscopy 5-10 yrs ago    H/O right nephrectomy 8/2014, complicated by excess blood loss, had to go back to OR next day, had total of 18 units PRBC    History of loop recorder Placed 8 yrs ago    S/P inguinal herniorrhaphy Left, x2

## 2022-08-26 NOTE — H&P PST ADULT - NSICDXPASTMEDICALHX_GEN_ALL_CORE_FT
PAST MEDICAL HISTORY:  Arthritis     BPH (benign prostatic hyperplasia)     CVA (cerebrovascular accident) ~8yrs ago    GERD (gastroesophageal reflux disease)     HTN (hypertension)     Hyperlipidemia     Left cataract     Left inguinal hernia     Low testosterone     Lumbar stenosis     Obstructive sleep apnea uses CPAP    Renal carcinoma Right    Renal mass RIght    Spondylosis

## 2022-08-26 NOTE — H&P PST ADULT - RESPIRATORY AND THORAX COMMENTS
Pulmonary evaluation done Dr Hudson within the last 2 yrs, patient c/o dyspnea on exertion , no significant findings

## 2022-08-26 NOTE — H&P PST ADULT - CARDIOVASCULAR COMMENTS
Cardiac evaluation done with Dr Vitale within the last 2yrs, no significant findings +3 BLE , non pitting edema

## 2022-08-26 NOTE — H&P PST ADULT - ASSESSMENT
82 y.o male scheduled for  82 y.o male scheduled for  Bilateral Temporal Artery Biopsy   Plan  1. Stop all NSAIDS, herbal supplements and vitamins for 7 days.  To continue ASA & Plavix per surgeon directions.   2. NPO at midnight.  3. Take the following medications--none-- with small sips of water on the morning of your procedure/surgery.  4. Labs, EKG, CXR as per surgeon  5. PMD THANH Rogers  visit for optimization prior to surgery as per surgeon  6. COVID swab done today--results pending

## 2022-08-27 DIAGNOSIS — M31.6 OTHER GIANT CELL ARTERITIS: ICD-10-CM

## 2022-08-27 DIAGNOSIS — Z01.818 ENCOUNTER FOR OTHER PREPROCEDURAL EXAMINATION: ICD-10-CM

## 2022-08-27 LAB — SARS-COV-2 N GENE NPH QL NAA+PROBE: NOT DETECTED

## 2022-08-29 RX ORDER — FENTANYL CITRATE 50 UG/ML
50 INJECTION INTRAVENOUS
Refills: 0 | Status: DISCONTINUED | OUTPATIENT
Start: 2022-08-30 | End: 2022-08-30

## 2022-08-29 RX ORDER — SODIUM CHLORIDE 9 MG/ML
1000 INJECTION, SOLUTION INTRAVENOUS
Refills: 0 | Status: DISCONTINUED | OUTPATIENT
Start: 2022-08-30 | End: 2022-08-30

## 2022-08-29 RX ORDER — ONDANSETRON 8 MG/1
4 TABLET, FILM COATED ORAL ONCE
Refills: 0 | Status: DISCONTINUED | OUTPATIENT
Start: 2022-08-30 | End: 2022-08-30

## 2022-08-29 RX ORDER — FENTANYL CITRATE 50 UG/ML
25 INJECTION INTRAVENOUS
Refills: 0 | Status: DISCONTINUED | OUTPATIENT
Start: 2022-08-30 | End: 2022-08-30

## 2022-08-29 RX ORDER — SODIUM CHLORIDE 9 MG/ML
3 INJECTION INTRAMUSCULAR; INTRAVENOUS; SUBCUTANEOUS EVERY 8 HOURS
Refills: 0 | Status: DISCONTINUED | OUTPATIENT
Start: 2022-08-30 | End: 2022-08-30

## 2022-08-29 NOTE — CHART NOTE - NSCHARTNOTEFT_GEN_A_CORE
8/29/2022--Left message for surgical coordinator for Dr Trent that patient will require medical clearance

## 2022-08-30 ENCOUNTER — RESULT REVIEW (OUTPATIENT)
Age: 82
End: 2022-08-30

## 2022-08-30 ENCOUNTER — TRANSCRIPTION ENCOUNTER (OUTPATIENT)
Age: 82
End: 2022-08-30

## 2022-08-30 ENCOUNTER — APPOINTMENT (OUTPATIENT)
Dept: VASCULAR SURGERY | Facility: HOSPITAL | Age: 82
End: 2022-08-30

## 2022-08-30 ENCOUNTER — OUTPATIENT (OUTPATIENT)
Dept: INPATIENT UNIT | Facility: HOSPITAL | Age: 82
LOS: 1 days | Discharge: HOME | End: 2022-08-30
Payer: MEDICARE

## 2022-08-30 VITALS
HEIGHT: 67 IN | SYSTOLIC BLOOD PRESSURE: 164 MMHG | DIASTOLIC BLOOD PRESSURE: 83 MMHG | WEIGHT: 212.08 LBS | HEART RATE: 59 BPM | RESPIRATION RATE: 16 BRPM | OXYGEN SATURATION: 100 % | TEMPERATURE: 98 F

## 2022-08-30 VITALS
OXYGEN SATURATION: 100 % | RESPIRATION RATE: 16 BRPM | DIASTOLIC BLOOD PRESSURE: 84 MMHG | TEMPERATURE: 97 F | SYSTOLIC BLOOD PRESSURE: 155 MMHG | HEART RATE: 58 BPM

## 2022-08-30 DIAGNOSIS — Z79.82 LONG TERM (CURRENT) USE OF ASPIRIN: ICD-10-CM

## 2022-08-30 DIAGNOSIS — G47.33 OBSTRUCTIVE SLEEP APNEA (ADULT) (PEDIATRIC): ICD-10-CM

## 2022-08-30 DIAGNOSIS — Z90.5 ACQUIRED ABSENCE OF KIDNEY: ICD-10-CM

## 2022-08-30 DIAGNOSIS — E78.5 HYPERLIPIDEMIA, UNSPECIFIED: ICD-10-CM

## 2022-08-30 DIAGNOSIS — I12.9 HYPERTENSIVE CHRONIC KIDNEY DISEASE WITH STAGE 1 THROUGH STAGE 4 CHRONIC KIDNEY DISEASE, OR UNSPECIFIED CHRONIC KIDNEY DISEASE: ICD-10-CM

## 2022-08-30 DIAGNOSIS — N40.0 BENIGN PROSTATIC HYPERPLASIA WITHOUT LOWER URINARY TRACT SYMPTOMS: ICD-10-CM

## 2022-08-30 DIAGNOSIS — Z85.528 PERSONAL HISTORY OF OTHER MALIGNANT NEOPLASM OF KIDNEY: ICD-10-CM

## 2022-08-30 DIAGNOSIS — Z90.5 ACQUIRED ABSENCE OF KIDNEY: Chronic | ICD-10-CM

## 2022-08-30 DIAGNOSIS — M31.6 OTHER GIANT CELL ARTERITIS: ICD-10-CM

## 2022-08-30 DIAGNOSIS — Z79.02 LONG TERM (CURRENT) USE OF ANTITHROMBOTICS/ANTIPLATELETS: ICD-10-CM

## 2022-08-30 DIAGNOSIS — N18.31 CHRONIC KIDNEY DISEASE, STAGE 3A: ICD-10-CM

## 2022-08-30 DIAGNOSIS — Z99.89 DEPENDENCE ON OTHER ENABLING MACHINES AND DEVICES: ICD-10-CM

## 2022-08-30 DIAGNOSIS — Z86.73 PERSONAL HISTORY OF TRANSIENT ISCHEMIC ATTACK (TIA), AND CEREBRAL INFARCTION WITHOUT RESIDUAL DEFICITS: ICD-10-CM

## 2022-08-30 DIAGNOSIS — Z98.890 OTHER SPECIFIED POSTPROCEDURAL STATES: Chronic | ICD-10-CM

## 2022-08-30 DIAGNOSIS — H26.9 UNSPECIFIED CATARACT: Chronic | ICD-10-CM

## 2022-08-30 PROBLEM — I63.9 CEREBRAL INFARCTION, UNSPECIFIED: Chronic | Status: ACTIVE | Noted: 2022-08-26

## 2022-08-30 PROBLEM — M48.061 SPINAL STENOSIS, LUMBAR REGION WITHOUT NEUROGENIC CLAUDICATION: Chronic | Status: ACTIVE | Noted: 2022-08-26

## 2022-08-30 PROBLEM — M19.90 UNSPECIFIED OSTEOARTHRITIS, UNSPECIFIED SITE: Chronic | Status: ACTIVE | Noted: 2022-08-26

## 2022-08-30 PROBLEM — K40.90 UNILATERAL INGUINAL HERNIA, WITHOUT OBSTRUCTION OR GANGRENE, NOT SPECIFIED AS RECURRENT: Chronic | Status: ACTIVE | Noted: 2022-08-26

## 2022-08-30 PROBLEM — R79.89 OTHER SPECIFIED ABNORMAL FINDINGS OF BLOOD CHEMISTRY: Chronic | Status: ACTIVE | Noted: 2022-08-26

## 2022-08-30 PROBLEM — I10 ESSENTIAL (PRIMARY) HYPERTENSION: Chronic | Status: ACTIVE | Noted: 2022-08-26

## 2022-08-30 PROBLEM — C64.9 MALIGNANT NEOPLASM OF UNSPECIFIED KIDNEY, EXCEPT RENAL PELVIS: Chronic | Status: ACTIVE | Noted: 2022-08-26

## 2022-08-30 PROBLEM — K21.9 GASTRO-ESOPHAGEAL REFLUX DISEASE WITHOUT ESOPHAGITIS: Chronic | Status: ACTIVE | Noted: 2022-08-26

## 2022-08-30 PROBLEM — M47.9 SPONDYLOSIS, UNSPECIFIED: Chronic | Status: ACTIVE | Noted: 2022-08-26

## 2022-08-30 PROCEDURE — 88305 TISSUE EXAM BY PATHOLOGIST: CPT

## 2022-08-30 PROCEDURE — 88305 TISSUE EXAM BY PATHOLOGIST: CPT | Mod: 26

## 2022-08-30 PROCEDURE — 37609 LIGATION/BX TEMPORAL ARTERY: CPT | Mod: 50

## 2022-08-30 PROCEDURE — C1889: CPT

## 2022-08-30 RX ORDER — OMEGA-3 ACID ETHYL ESTERS 1 G
0 CAPSULE ORAL
Qty: 0 | Refills: 0 | DISCHARGE

## 2022-08-30 RX ORDER — CLOPIDOGREL BISULFATE 75 MG/1
1 TABLET, FILM COATED ORAL
Qty: 0 | Refills: 0 | DISCHARGE

## 2022-08-30 RX ORDER — FAMOTIDINE 10 MG/ML
0 INJECTION INTRAVENOUS
Qty: 0 | Refills: 0 | DISCHARGE

## 2022-08-30 RX ORDER — LOSARTAN POTASSIUM 100 MG/1
1 TABLET, FILM COATED ORAL
Qty: 0 | Refills: 0 | DISCHARGE

## 2022-08-30 RX ORDER — ROSUVASTATIN CALCIUM 5 MG/1
1 TABLET ORAL
Qty: 0 | Refills: 0 | DISCHARGE

## 2022-08-30 RX ORDER — ASPIRIN/CALCIUM CARB/MAGNESIUM 324 MG
1 TABLET ORAL
Qty: 0 | Refills: 0 | DISCHARGE

## 2022-08-30 RX ORDER — ACETAMINOPHEN 500 MG
1000 TABLET ORAL ONCE
Refills: 0 | Status: COMPLETED | OUTPATIENT
Start: 2022-08-30 | End: 2022-08-30

## 2022-08-30 RX ORDER — TAMSULOSIN HYDROCHLORIDE 0.4 MG/1
1 CAPSULE ORAL
Qty: 0 | Refills: 0 | DISCHARGE

## 2022-08-30 RX ADMIN — Medication 400 MILLIGRAM(S): at 12:35

## 2022-08-30 RX ADMIN — Medication 1000 MILLIGRAM(S): at 12:50

## 2022-08-30 RX ADMIN — SODIUM CHLORIDE 100 MILLILITER(S): 9 INJECTION, SOLUTION INTRAVENOUS at 12:35

## 2022-08-30 NOTE — ASU DISCHARGE PLAN (ADULT/PEDIATRIC) - CARE PROVIDER_API CALL
Handy Duncan)  Surgery; Vascular Surgery  [address], [address] [place] [address]  Phone: [phone]  Fax: [phone]  Follow Up Time: 2 weeks

## 2022-08-30 NOTE — ASU DISCHARGE PLAN (ADULT/PEDIATRIC) - HAVE YOU HAD A FIRST COVID-19 BOOSTER?
intractable nausea and vomiting, clinically appear dehydrated, has KERRI  Continue IV fluid  Full liquid diet and advance as tolerated  Continue reglan tid  IV zofran prn  CT abd with Prominent fluid-filled small bowel loops possible mild nonspecific enteritis. Monitor for now, if persistent symptoms despite treatment, will consult GI  Check Utox- report last marijuana use was 1 month ago  Avoid narcotic unless absolutely necessary given h/o gastroparesis
Yes

## 2022-08-30 NOTE — ASU PATIENT PROFILE, ADULT - TRANSFUSION HX COMMENT, PROFILE
after right nephrectomy, patient states given 18 units of PRBC, done at [facility]

## 2022-08-30 NOTE — ASU DISCHARGE PLAN (ADULT/PEDIATRIC) - NS MD DC FALL RISK RISK
For information on Fall & Injury Prevention, visit: [email or web address] OR  [email or web address] OR  https://www.cdc.gov/steadi/patient.html

## 2022-08-30 NOTE — ASU PATIENT PROFILE, ADULT - FALL HARM RISK - UNIVERSAL INTERVENTIONS
Bed in lowest position, wheels locked, appropriate side rails in place/Call bell, personal items and telephone in reach/Instruct patient to call for assistance before getting out of bed or chair/Non-slip footwear when patient is out of bed/[place] to call system/Physically safe environment - no spills, clutter or unnecessary equipment/Purposeful Proactive Rounding/Room/bathroom lighting operational, light cord in reach

## 2022-08-30 NOTE — BRIEF OPERATIVE NOTE - DISPOSITION
Problem: Respiratory Impairment - Respiratory Therapy 253  Intervention: Inhaled medication delivery  Intervention Status  Done         PACU

## 2022-08-30 NOTE — ASU PATIENT PROFILE, ADULT - PRO MENTAL HEALTH SX RECENT
[facility]   COMPREHENSIVE EPILEPSY CENTER   REPORT OF CONTINUOUS VIDEO EEG     [facility]: [address], [place] [address]  [facility]: [id number] [place] [id number]  [facility]: [address] [place] [address]    Patient Name: AFTAB BASS  Age and : 64y ([birth year])  MRN #: [id number]  Location: [facility] 5MON 521   Referring Physician: Vanessa Bishop    Start Time/Date: 08:00 on 20  End Time/Date: 17:00 on 20   Duration: 9hrs    _____________________________________________________________  STUDY INFORMATION    EEG Recording Technique:  The patient underwent continuous Video-EEG monitoring, using Telemetry System hardware on the XLTek Digital System. EEG and video data were stored on a computer hard drive with important events saved in digital archive files. The material was reviewed by a physician (electroencephalographer / epileptologist) on a daily basis. Moy and seizure detection algorithms were utilized and reviewed. An EEG Technician attended to the patient, and was available throughout daytime work hours.  The epilepsy center neurologist was available in person or on call 24-hours per day.    EEG Placement and Labeling of Electrodes:  The EEG was performed utilizing 20 channel referential EEG connections (coronal over temporal over parasagittal montage) using all standard 10-20 electrode placements with EKG, with additional electrodes placed in the inferior temporal region using the modified 10-10 montage electrode placements for elective admissions, or if deemed necessary. Recording was at a sampling rate of 256 samples per second per channel. Time synchronized digital video recording was done simultaneously with EEG recording. A low light infrared camera was used for low light recording.     _____________________________________________________________  HISTORY    Patient is a 64y old  Female who presents with a chief complaint of change in mental status (2020 08:01)        PERTINENT MEDICATIONS:  none    --------------------------------------------------------------------------------------------------  Study Interpretation:    [[[Abbreviation Key:  PDR=alpha rhythm/posterior dominant rhythm. A-P=anterior posterior gradient.  Amplitude: ‘very low’:<20; ‘low’:20-50; ‘medium’:; ‘high’:>200uV.  Persistence for periodic/rhythmic patterns (% of epoch) ‘rare’:<1%; ‘occasional’:1-10%; ‘frequent’:10-50%; ‘abundant’:50-90%; ‘continuous’:>90%.  Persistence for sporadic discharges: ‘rare’:<1/hr; ‘occasional’:1/min-1/hr; ‘frequent’:>1/min; ‘abundant’:>1/10 sec.  GRDA=generalized rhythmic delta activity, LRDA=lateralized rhythmic delta activity, TIRDA=temporal intermittent rhythmic delta activity, FIRDA=frontal intermittent rhythmic activity. LPD=PLED=lateralized periodic discharges, GPD=generalized periodic discharges, BiPDs=BiPLEDs=bilateral independent periodic epileptiform discharges, SIRPID=stimulus induced rhythmic, periodic, or ictal appearing discharges.  Modifiers: +F=with fast component, +S=with spike component, +R=with rhythmic component.  S-B=burst suppression pattern.  Max=maximal. N1-drowsy, N2-stage II sleep, N3-slow wave sleep.  HV=hyperventilation, PS=photic stimulation]]]    FINDINGS:  The background was continuous, spontaneously variable and reactive.  During wakefulness, the posteriorly dominant rhythm was well-modulated at 9-10 Hz. Background consisted predominantly of alpha, theta and diffuse excess delta activities     Sleep Background:  Drowsiness was characterized by fragmentation, attenuation, and slowing of the background activity.    Stage II sleep transients were characterized by sleep spindles and K complexes.    Epileptiform Activity:   No epileptiform discharges were present.    Events:  No clinical events were recorded.  No seizures were recorded.    Activation Procedures:   -Hyperventilation was not performed.    -Photic stimulation was not performed.    Artifacts:  Intermittent myogenic and movement artifacts were noted.    ECG:  The heart rate on single channel ECG at baseline was predominantly near BPM = 80-90  -----------------------------------------------------------------------------------------------------    EEG Classification / Summary:  Abnormal EEG study in awake, drowsy and asleep states.  Mild background slowing    -----------------------------------------------------------------------------------------------------    Clinical Impression:  Mild diffuse or multifocal cerebral dysfunction, not specific as to etiology.  There were no epileptiform abnormalities recorded.      -------------------------------------------------------------------------------------------------------  Jake Rea DO  Epilepsy Fellow, [facility]    Kelli Mendez MD  Attending Physician, [facility] Epilepsy [facility]   COMPREHENSIVE EPILEPSY CENTER   REPORT OF CONTINUOUS VIDEO EEG     [facility]: [address], [place] [address]  [facility]: [phone] [place] [address]  [facility]: [address] [place] [address]    Patient Name: AFTAB BASS  Age and : 64y ([birth year])  MRN #: [id number]  Location: [facility] 5MON 521   Referring Physician: Vanessa Bishop    Start Time/Date: 08:00 on 20  End Time/Date: 17:00 on 20   Duration: 9hrs    _____________________________________________________________  STUDY INFORMATION    EEG Recording Technique:  The patient underwent continuous Video-EEG monitoring, using Telemetry System hardware on the XLTek Digital System. EEG and video data were stored on a computer hard drive with important events saved in digital archive files. The material was reviewed by a physician (electroencephalographer / epileptologist) on a daily basis. Moy and seizure detection algorithms were utilized and reviewed. An EEG Technician attended to the patient, and was available throughout daytime work hours.  The epilepsy center neurologist was available in person or on call 24-hours per day.    EEG Placement and Labeling of Electrodes:  The EEG was performed utilizing 20 channel referential EEG connections (coronal over temporal over parasagittal montage) using all standard 10-20 electrode placements with EKG, with additional electrodes placed in the inferior temporal region using the modified 10-10 montage electrode placements for elective admissions, or if deemed necessary. Recording was at a sampling rate of 256 samples per second per channel. Time synchronized digital video recording was done simultaneously with EEG recording. A low light infrared camera was used for low light recording.     _____________________________________________________________  HISTORY    Patient is a 64y old  Female who presents with a chief complaint of change in mental status (2020 08:01)        PERTINENT MEDICATIONS:  none    --------------------------------------------------------------------------------------------------  Study Interpretation:    [[[Abbreviation Key:  PDR=alpha rhythm/posterior dominant rhythm. A-P=anterior posterior gradient.  Amplitude: ‘very low’:<20; ‘low’:20-50; ‘medium’:; ‘high’:>200uV.  Persistence for periodic/rhythmic patterns (% of epoch) ‘rare’:<1%; ‘occasional’:1-10%; ‘frequent’:10-50%; ‘abundant’:50-90%; ‘continuous’:>90%.  Persistence for sporadic discharges: ‘rare’:<1/hr; ‘occasional’:1/min-1/hr; ‘frequent’:>1/min; ‘abundant’:>1/10 sec.  GRDA=generalized rhythmic delta activity, LRDA=lateralized rhythmic delta activity, TIRDA=temporal intermittent rhythmic delta activity, FIRDA=frontal intermittent rhythmic activity. LPD=PLED=lateralized periodic discharges, GPD=generalized periodic discharges, BiPDs=BiPLEDs=bilateral independent periodic epileptiform discharges, SIRPID=stimulus induced rhythmic, periodic, or ictal appearing discharges.  Modifiers: +F=with fast component, +S=with spike component, +R=with rhythmic component.  S-B=burst suppression pattern.  Max=maximal. N1-drowsy, N2-stage II sleep, N3-slow wave sleep.  HV=hyperventilation, PS=photic stimulation]]]    FINDINGS:  The background was continuous, spontaneously variable and reactive.  During wakefulness, the posteriorly dominant rhythm was well-modulated at 9-10 Hz. Background consisted predominantly of alpha, theta and diffuse excess delta activities     Sleep Background:  Drowsiness was characterized by fragmentation, attenuation, and slowing of the background activity.    Stage II sleep transients were characterized by sleep spindles and K complexes.    Epileptiform Activity:   No epileptiform discharges were present.    Events:  No clinical events were recorded.  No seizures were recorded.    Activation Procedures:   -Hyperventilation was not performed.    -Photic stimulation was not performed.    Artifacts:  Intermittent myogenic and movement artifacts were noted.    ECG:  The heart rate on single channel ECG at baseline was predominantly near BPM = 80-90  -----------------------------------------------------------------------------------------------------    EEG Classification / Summary:  Abnormal EEG study in awake, drowsy and asleep states.  Mild background slowing    -----------------------------------------------------------------------------------------------------    Clinical Impression:  Mild diffuse or multifocal cerebral dysfunction, not specific as to etiology.  There were no epileptiform abnormalities recorded.      -------------------------------------------------------------------------------------------------------  Jake Rea DO  Epilepsy Fellow, [facility]psy [facility]    Kelli Mendez MD  Attending Physician, [facility] Epilepsy [place] none

## 2022-08-30 NOTE — ASU DISCHARGE PLAN (ADULT/PEDIATRIC) - ASU DC SPECIAL INSTRUCTIONSFT
Please take over the counter pain medications as needed for pain. You may shower in 2 days.  Follow up in 2 weeks in the office  If you have any issues call the number above or come to the ER.

## 2022-09-15 ENCOUNTER — OUTPATIENT (OUTPATIENT)
Dept: OUTPATIENT SERVICES | Facility: HOSPITAL | Age: 82
LOS: 1 days | End: 2022-09-15
Payer: MEDICARE

## 2022-09-15 ENCOUNTER — APPOINTMENT (OUTPATIENT)
Dept: RADIOLOGY | Facility: CLINIC | Age: 82
End: 2022-09-15

## 2022-09-15 DIAGNOSIS — Z98.890 OTHER SPECIFIED POSTPROCEDURAL STATES: Chronic | ICD-10-CM

## 2022-09-15 DIAGNOSIS — H26.9 UNSPECIFIED CATARACT: Chronic | ICD-10-CM

## 2022-09-15 DIAGNOSIS — Z90.5 ACQUIRED ABSENCE OF KIDNEY: Chronic | ICD-10-CM

## 2022-09-15 DIAGNOSIS — Z00.8 ENCOUNTER FOR OTHER GENERAL EXAMINATION: ICD-10-CM

## 2022-09-15 PROCEDURE — 77080 DXA BONE DENSITY AXIAL: CPT | Mod: 26

## 2022-09-15 PROCEDURE — 77080 DXA BONE DENSITY AXIAL: CPT

## 2022-09-16 ENCOUNTER — APPOINTMENT (OUTPATIENT)
Dept: VASCULAR SURGERY | Facility: CLINIC | Age: 82
End: 2022-09-16
Payer: MEDICARE

## 2022-09-16 VITALS
HEIGHT: 67 IN | BODY MASS INDEX: 33.59 KG/M2 | OXYGEN SATURATION: 99 % | DIASTOLIC BLOOD PRESSURE: 80 MMHG | SYSTOLIC BLOOD PRESSURE: 158 MMHG | HEART RATE: 70 BPM | WEIGHT: 214 LBS

## 2022-09-16 DIAGNOSIS — R51.9 HEADACHE, UNSPECIFIED: ICD-10-CM

## 2022-09-16 PROCEDURE — 99212 OFFICE O/P EST SF 10 MIN: CPT

## 2022-12-16 NOTE — ASU PATIENT PROFILE, ADULT - NS SC CAGE ALCOHOL EYE OPENER
Caller: Deisy Jones    Relationship: Self    Best call back number: [phone]    Caller requesting test results: DEISY    What test was performed: LABS    When was the test performed: 12/14/22    Where was the test performed: IN OFFICE    Additional notes: PATIENT WOULD LIKE A CALL BACK TO DISCUSS LAB RESULTS IN FURTHER DETAILS.       
Patient has viewed labs on IN[facility], we discussed results in length and she reports that her dizziness is worsening and she is requesting medical advice once PCP reviews labs. PCP notified    
no

## 2023-03-08 ENCOUNTER — APPOINTMENT (OUTPATIENT)
Dept: CARDIOLOGY | Facility: CLINIC | Age: 83
End: 2023-03-08

## 2023-03-14 PROBLEM — R51.9 HEAD PAIN CEPHALGIA: Status: ACTIVE | Noted: 2022-04-22

## 2023-03-14 NOTE — ASSESSMENT
[FreeTextEntry1] : S/P temporal artery biopsy, healing well. No new complaints. Pathology + for temporal arteritis.\par \par F/U with neurology

## 2023-05-15 ENCOUNTER — NON-APPOINTMENT (OUTPATIENT)
Age: 83
End: 2023-05-15

## 2023-05-15 DIAGNOSIS — N40.0 BENIGN PROSTATIC HYPERPLASIA WITHOUT LOWER URINARY TRACT SYMPMS: ICD-10-CM

## 2023-05-15 DIAGNOSIS — Z86.79 PERSONAL HISTORY OF OTHER DISEASES OF THE CIRCULATORY SYSTEM: ICD-10-CM

## 2023-05-15 DIAGNOSIS — Z86.69 PERSONAL HISTORY OF OTHER DISEASES OF THE NERVOUS SYSTEM AND SENSE ORGANS: ICD-10-CM

## 2023-05-15 DIAGNOSIS — M27.40 UNSPECIFIED CYST OF JAW: ICD-10-CM

## 2023-05-15 RX ORDER — ROSUVASTATIN CALCIUM 5 MG/1
5 TABLET, FILM COATED ORAL DAILY
Refills: 0 | Status: ACTIVE | COMMUNITY

## 2023-05-15 RX ORDER — OMEGA-3/DHA/EPA/FISH OIL 1200 MG
1200 CAPSULE ORAL TWICE DAILY
Refills: 0 | Status: ACTIVE | COMMUNITY

## 2023-06-28 ENCOUNTER — APPOINTMENT (OUTPATIENT)
Dept: DERMATOLOGY | Facility: CLINIC | Age: 83
End: 2023-06-28
Payer: MEDICARE

## 2023-06-28 PROCEDURE — 99214 OFFICE O/P EST MOD 30 MIN: CPT

## 2023-06-28 NOTE — ASSESSMENT
[FreeTextEntry1] : Seb derm with inflammatory changes; \par Continue rotating medicated shampoos; \par \par Therapeutic options and their risks and benefits; along with multiple diagnostic possibilities were discussed at length; risks and benefits of further study were discussed;\par \par Add clobetasol soln BID x 1-2 wks;  \par f/u winter;  sooner prn

## 2023-06-28 NOTE — HISTORY OF PRESENT ILLNESS
[de-identified] : Itching in scalp;  uses medicated shampoos; OTC and ketoconazole; \par worse recently

## 2023-07-25 ENCOUNTER — APPOINTMENT (OUTPATIENT)
Dept: CT IMAGING | Facility: CLINIC | Age: 83
End: 2023-07-25
Payer: MEDICARE

## 2023-07-25 ENCOUNTER — OUTPATIENT (OUTPATIENT)
Dept: OUTPATIENT SERVICES | Facility: HOSPITAL | Age: 83
LOS: 1 days | End: 2023-07-25
Payer: MEDICARE

## 2023-07-25 DIAGNOSIS — H26.9 UNSPECIFIED CATARACT: Chronic | ICD-10-CM

## 2023-07-25 DIAGNOSIS — Z98.890 OTHER SPECIFIED POSTPROCEDURAL STATES: Chronic | ICD-10-CM

## 2023-07-25 DIAGNOSIS — C64.9 MALIGNANT NEOPLASM OF UNSPECIFIED KIDNEY, EXCEPT RENAL PELVIS: ICD-10-CM

## 2023-07-25 PROCEDURE — 74176 CT ABD & PELVIS W/O CONTRAST: CPT

## 2023-07-25 PROCEDURE — 71250 CT THORAX DX C-: CPT | Mod: 26,MH

## 2023-07-25 PROCEDURE — 71250 CT THORAX DX C-: CPT

## 2023-07-25 PROCEDURE — 74176 CT ABD & PELVIS W/O CONTRAST: CPT | Mod: 26,MH

## 2023-08-10 ENCOUNTER — OUTPATIENT (OUTPATIENT)
Dept: OUTPATIENT SERVICES | Facility: HOSPITAL | Age: 83
LOS: 1 days | Discharge: HOME | End: 2023-08-10

## 2023-08-10 DIAGNOSIS — Z98.890 OTHER SPECIFIED POSTPROCEDURAL STATES: Chronic | ICD-10-CM

## 2023-08-10 DIAGNOSIS — C64.9 MALIGNANT NEOPLASM OF UNSPECIFIED KIDNEY, EXCEPT RENAL PELVIS: ICD-10-CM

## 2023-08-10 DIAGNOSIS — H26.9 UNSPECIFIED CATARACT: Chronic | ICD-10-CM

## 2023-08-10 DIAGNOSIS — Z90.5 ACQUIRED ABSENCE OF KIDNEY: Chronic | ICD-10-CM

## 2023-08-16 ENCOUNTER — APPOINTMENT (OUTPATIENT)
Dept: HEMATOLOGY ONCOLOGY | Facility: CLINIC | Age: 83
End: 2023-08-16
Payer: MEDICARE

## 2023-08-16 VITALS
HEART RATE: 73 BPM | SYSTOLIC BLOOD PRESSURE: 146 MMHG | WEIGHT: 212.5 LBS | TEMPERATURE: 97.2 F | OXYGEN SATURATION: 97 % | DIASTOLIC BLOOD PRESSURE: 87 MMHG | RESPIRATION RATE: 16 BRPM | BODY MASS INDEX: 33.35 KG/M2 | HEIGHT: 67 IN

## 2023-08-16 DIAGNOSIS — I63.9 CEREBRAL INFARCTION, UNSPECIFIED: ICD-10-CM

## 2023-08-16 DIAGNOSIS — N18.30 CHRONIC KIDNEY DISEASE, STAGE 3 UNSPECIFIED: ICD-10-CM

## 2023-08-16 DIAGNOSIS — C64.9 MALIGNANT NEOPLASM OF UNSPECIFIED KIDNEY, EXCEPT RENAL PELVIS: ICD-10-CM

## 2023-08-16 PROCEDURE — 99213 OFFICE O/P EST LOW 20 MIN: CPT

## 2023-08-16 RX ORDER — LOSARTAN POTASSIUM 50 MG/1
50 TABLET, FILM COATED ORAL DAILY
Refills: 0 | Status: DISCONTINUED | COMMUNITY
End: 2023-08-16

## 2023-08-16 RX ORDER — FINASTERIDE 5 MG/1
5 TABLET, FILM COATED ORAL DAILY
Qty: 30 | Refills: 0 | Status: ACTIVE | COMMUNITY
Start: 2023-08-16 | End: 1900-01-01

## 2023-08-18 PROBLEM — N18.30 STAGE III CHRONIC KIDNEY DISEASE: Status: ACTIVE | Noted: 2023-05-15

## 2023-08-18 RX ORDER — TOCILIZUMAB 20 MG/ML
INJECTION, SOLUTION, CONCENTRATE INTRAVENOUS
Refills: 0 | Status: ACTIVE | COMMUNITY

## 2023-08-18 NOTE — RESULTS/DATA
[FreeTextEntry1] : EXAM: [id number] - CT ABDOMEN AND PELVIS  - ORDERED BY: RODGER DOMINIQUE EXAM: [id number] - CT CHEST  - ORDERED BY: RODGER DOMINIQUE PROCEDURE DATE:  07/25/2023 INTERPRETATION:  CLINICAL INFORMATION: Renal cell carcinoma COMPARISON: CT scan of the chest, abdomen and pelvis from 7/21/2022  CONTRAST/COMPLICATIONS: IV Contrast: None Oral Contrast: None Complications: None  PROCEDURE: CT of the Chest, Abdomen and Pelvis was performed. Sagittal and coronal reformats were performed.  FINDINGS: CHEST: LUNGS AND LARGE AIRWAYS: Patent central airways. 4 mm calcified granuloma in the right upper lobe. Linear atelectasis at the lung bases. PLEURA: No pleural effusion. VESSELS: Coronary artery calcifications and/or stents. Mild additional vascular calcifications. HEART: Heart size is normal. No pericardial effusion. MEDIASTINUM AND CHRISTA: No lymphadenopathy. CHEST WALL AND LOWER NECK: Electronic device in the subcutaneous tissue of the anterior chest wall to the left of midline which is unchanged. Correlate with patient's history.  ABDOMEN AND PELVIS: LIVER: Within normal limits. BILE DUCTS: Normal caliber. GALLBLADDER: Gallstones without focal inflammation. SPLEEN: Within normal limits. PANCREAS: Within normal limits. ADRENALS: 1.5 x 1.5 cm left adrenal nodule measuring 30 Hounsfield units cannot be further characterized and is grossly stable. KIDNEYS/URETERS: Status post right-sided nephrectomy with postsurgical changes. Limited evaluation without intravenous contrast. No abnormal soft tissue is identified in the nephrectomy bed. Left renal cyst. No left-sided hydronephrosis.  BLADDER: Underdistended and grossly unremarkable. REPRODUCTIVE ORGANS: Prostate gland.  BOWEL: No bowel obstruction. No evidence for appendicitis. PERITONEUM: No ascites. VESSELS: Vascular calcifications. RETROPERITONEUM/LYMPH NODES: No lymphadenopathy. ABDOMINAL WALL: Status post left inguinal hernia repair. Right inguinal hernia containing fat. BONES: Degenerative changes of bone.  IMPRESSION: No gross evidence for recurrent or metastatic disease on this noncontrast CT scan.  Stable left adrenal nodule which is nondiagnostic. Abdominal MRI from 3/30/2017 stated this most likely represents an adenoma.  --- End of Report --- FELIX LEROY MD; Attending Radiologist This document has been electronically signed. Jul 28 2023  2:41P

## 2023-08-18 NOTE — PHYSICAL EXAM
[Fully active, able to carry on all pre-disease performance without restriction] : Status 0 - Fully active, able to carry on all pre-disease performance without restriction [Normal] : affect appropriate [de-identified] : +b/l pitting edema.

## 2023-08-18 NOTE — HISTORY OF PRESENT ILLNESS
[Disease: _____________________] : Disease: [unfilled] [T: ___] : T[unfilled] [N: ___] : N[unfilled] [M: ___] : M[unfilled] [AJCC Stage: ____] : AJCC Stage: [unfilled] [de-identified] : First seen by me October of 2013, this 74 year old man presented with gross hematuria and then underwent a CT scan.  He had a 25-pound weight loss before the diagnosis and he was found to be anemic.  A renal mass was found on the right side and he underwent resection complicated by a postoperative bleeding episode. This was a pathologic T3NXM0 tumor.  He was hospitalized from 08/27/13 via the emergency room until 09/17/13.  He was transfused 7 units of blood and he went back to the operating room on two occasions for removal of packing.  The pathology report is dated 08/29/13.  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  Tumor extended into the renal sinus fat and the adrenal gland was negative.  Margins were negative for invasive carcinoma.  No lymph nodes were submitted.  The tumor was pathologic T3aN0 and presumed M0.    2/12/16....Feels well, no fatigue. Occasional pains in right side, present since the surgery. Notes a bulge in the area. Appetite is good, no weight loss. No dyspnea, cough/sputum. No blood in urine.   8/24/16...Feels well. Still  has some discomfort in the right flank area, present since the surgery. Intermittent tightness and uncomfortable sensation, mostly noted when he lies on his right side. Appetite good, no weight loss. No bone pains. No N/V/D/C. No respiratory issues. Has sleep apnea, uses CPAP.   7/10/18...Not seen in almost 2 years. Had recent CT. Feels well. No pains noted. Has chronic discomfort in the surgical area. Has some arthritic and intermittent low back pains for a few years. Appetite is normal, weight stable. No headaches, no dizziness, no balance issues, no falls. No visual disturbances. No leg edema except for small amount in AM. No bone pains. No cough. Some dyspnea when he bends over. Not with exertion.   7/30/19...Here for follow up for T3 RCC 2013. Fells well. No headaches, no dizziness, no balance issues. Appetite is good, no weight loss. Minor fatigue. No hematuria. Has some muscle discomfort after the surgery. Some finer arthritis, no bone pains.   6/4/20 - Verbal permission granted for telehealth services by the patient, Micky Douglas, on 6/4/20  at 10:24 AM.  Feels well, follow up for RCC. Appetite is good, no weight loss. Minor arthritic complaints. No major pains. No cough, No SOB. No F/C. No headaches, no balance issues, no dizziness. Fatigue is mild. Walks around the neighborhood. Urine flow is good. NOcturia x 1. No blood in urine. No edema.   7/21/21 - 2013, pT3aN0, grade 2, on surveillance. Feels "good". No pains  other than some arthritic complaints mostly of the knees. Appetite is good, no weight loss. No cough, no CAMPOS. NO headaches, no dizziness, no balance issues. NO visual changes. Occ edema at end of the day. No chest pains, no chest pressure. No palpitations. had CV vaccine. No fatigue.   8/2/22...2013, pT3aN0, grade 2, on surveillance. feels "okay". IN January, developed a "persistent" mild headache, saw PCP. He also had aches and weakness in quadriceps, with fatigue. He had a high ESR, 50-80, started prednisone as there was a wait to see a rheumatologist.  He had a head CT scan, was negative. Still has the headaches, rheum though that TA was unlikely and prednisone was stopped, Saw a neurologist, had MRI/MRA of brain. He says it was inconclusive, Had a second opinion from another  rheumatologist. ESR was 110. Had MR of lumbar and cervical spine and temporal sonogram.  On comprehensive metabolic panel his glucose was 85.  The BUN was 19 with a creatinine of 1.6.  Electrolytes were normal.  The calcium was 9.6.  Protein and albumin were normal.  Alkaline phosphatase was normal at 68.  AST and AST were low.  He was checked for Anaplasma which was negative.  Serum protein electrophoresis was performed and was found to be consistent with an acute inflammatory pattern.  There were no monoclonal proteins detected.  Kappa lambda light chains had a ratio of 1.04.  Creatinine kinase was normal.  Lyme titers were negative.  An HAYDEE was low positive as screening.  An antinuclear antibody was 1-40 titer which is borderline.  Hepatitis serology was negative.  Rheumatoid factor was normal.  C-reactive protein was notably elevated at 54.5.  Marie-1 antibody and SM and SM/RNP antibodies were negative.  Sjogren studies were negative.  Lyme titers were negative.  Babesia titers were negative.  His white blood cell count was 9.3 with a hemoglobin value of 11.8 g and hematocrit of 34.5%.  The MCV and MCH were normal.  The platelet count was 400,000.  The differential was normal.  The ESR was 110.  These were from June 22. No cough. has CAMPOS for years without any clear etiology, has stress echo, PFTs. Recently had a carotid sono, no issues. The headaches are mild, top of head, radiating to back of skull, like a scalp aches. It was better on the prednisone. (20 mg). Had fever knee pains on prednisone. No visual abnormalities, no N/V,, no photophobia, scotomata. No N/V/D, mild constipation present. Appetite is good, no weight loss of note. NO night sweats. No edema. No chest pain/pressure/palpitations. Urine flow is good, has OAB, nocturia x 1, some urgency, minor incontinence. NO blood, no dysuria. More anemic.. Has fatigue, present for last year. Back pain when standing for a time, bending forwards, present  for years.    [de-identified] : FG 2, clear cell [de-identified] : 8/16/23 ....patient is here for his yearly surveillance. He was diagnosed with pT3aN0, grade 2 RCC, in 2013. Reports over the last year he was diagnosed with temporal arthritis/GCA and treated with high dose prednisone, now on Tocilizumab monthly. Reports HA as much improved with the treatment however still has been having some mild HA. He was also noted to have worsening leg swelling and was found to have pulmonary HTN. He could not tolerate Lasix due to worsening SIDRA. Otherwise, he reports to workout daily and eat healthier. No pains, no dizziness or balance issues. No fatigue.

## 2023-08-18 NOTE — ASSESSMENT
[Palliative Care Plan] : not applicable at this time [FreeTextEntry1] : Micky Douglas is seen in the office today in follow-up.  In 2013 he had a pathologic T3a grade 2 renal cell carcinoma resected.  In January this year he developed a "persistent" mild headache.  He saw his primary care doctor.  The headache extends over the dome of the skull and towards the temporal regions.  He also had aches and weakness in his quadriceps with fatigue.  He was found to have a high sedimentation rate on 1 occasion in the 50s and another occasion in the 80s.  He was started on prednisone as there was a wait to see a rheumatologist.  The diagnosis was the possibility of temporal arteritis.  He had a head CT scan which was negative.  He continues to have the headaches.  He saw a rheumatologist and they thought that temporal arteritis was unlikely and prednisone was stopped.  He was then seen by a neurologist and an MRI and MRA of the brain was performed which she said did not show any abnormality.  He went to a second rheumatologist for another opinion.  His sedimentation rate was found to be 110.  The CRP was 57.  An MRI of the lumbar and cervical spine was performed as well as a temporal sonogram.  On the comprehensive metabolic panel, and his glucose was 85.  The BUN was 19 with a creatinine of 1.6.  Electrolytes were normal as was the calcium.  There was no protein gap.  The alkaline phosphatase was normal and the transaminases were low.  He was checked for Anaplasma which was negative.  An SPEP was performed and found to be consistent with an acute inflammatory pattern.  There were no monoclonal proteins detected.  Creatinine kinase was normal.  Lyme titers were negative.  An HAYDEE was low positive as a screening test.  An antinuclear antibody was 1-40.  Hepatitis serology was negative.  Rheumatoid factor was negative.  The C-reactive protein was notably elevated at 54.5.  Marie-1 antibody as well as SM and SM/RNP antibodies were negative.  Sjogren studies were negative.  Lyme titers were negative.  Babesia titers were negative.  His white blood cell count was 9.3 with a hemoglobin value of 11.8 and hematocrit of 34.5%.  He usually has normal to higher hemoglobin values.  These labs were from June 22.  On physical examination, he appears well.  His performance status is 1.  The HEENT examination is normal.  There is no palpable adenopathy in the neck region.  The lungs are clear, and the heart examination is normal.  There is pitting edema bilaterally, the abdominal examination is normal.  There were no palpable lymph nodes present.  There is no spinal column or chest wall tenderness to palpation or percussion.  Muscle power is normal at the pelvic girdle.  There are no abnormal reflexes.  Has been following with Rheumatology for GCA/TA, and he is receiving tocilizumab monthly.  Last CT C/A/P in July 2023 with no new concerning findings.   RTC in one year.  All questions were answered to the best my ability and to his apparent satisfaction.  Case was seen and discussed with Dr. Dashawn Mon MD.  PGY5 HEMONC fellow.  Attending Note. I met with his after Dr. Mon's initial assessment. I reviewd elements f the interval history and current issues. I performed a focused examination. I agree with Dr Mon's assessment and plan.  In the event of any change in his symptoms, he should return for an earlier evaluation. We used a nomogram to predict outcomes and discussed the results with him as we have done so in the past.   Issac Tamez MD

## 2023-12-22 ENCOUNTER — APPOINTMENT (OUTPATIENT)
Dept: DERMATOLOGY | Facility: CLINIC | Age: 83
End: 2023-12-22
Payer: MEDICARE

## 2023-12-22 DIAGNOSIS — L82.1 OTHER SEBORRHEIC KERATOSIS: ICD-10-CM

## 2023-12-22 PROCEDURE — 99214 OFFICE O/P EST MOD 30 MIN: CPT

## 2023-12-22 RX ORDER — KETOCONAZOLE 20.5 MG/ML
2 SHAMPOO, SUSPENSION TOPICAL
Qty: 1 | Refills: 5 | Status: ACTIVE | COMMUNITY
Start: 2022-01-05 | End: 1900-01-01

## 2023-12-22 RX ORDER — FAMOTIDINE 40 MG/1
TABLET, FILM COATED ORAL
Refills: 0 | Status: ACTIVE | COMMUNITY

## 2023-12-22 RX ORDER — AMLODIPINE BESYLATE 5 MG/1
TABLET ORAL
Refills: 0 | Status: ACTIVE | COMMUNITY

## 2023-12-22 RX ORDER — CLOBETASOL PROPIONATE 0.5 MG/ML
0.05 SOLUTION TOPICAL
Qty: 1 | Refills: 3 | Status: ACTIVE | COMMUNITY
Start: 2023-06-28 | End: 1900-01-01

## 2023-12-22 NOTE — ASSESSMENT
[FreeTextEntry1] : Seb derm:  stable; maintenance discussed with ketoconazole/OTC shampoos cont clobetasol prn   Therapeutic options and their risks and benefits; along with multiple diagnostic possibilities were discussed at length; risks and benefits of further study were discussed;  MARIIA turner f/u in 4-6 mos for TBSE

## 2023-12-22 NOTE — PHYSICAL EXAM
[FreeTextEntry3] : Scaling patches located on scalp; Primarily R side, postauricular   Scaling waxy stuck on papule; R medial infraorbital

## 2024-01-23 NOTE — ED PROVIDER NOTE - AXIS
[FreeTextEntry1] : The patient is a 67-year-old nulliparous postmenopausal white female.  She underwent menarche at age 15.  She underwent menopause at age 48 and never took any hormone replacement therapy.  She has a family history with her mother who had breast cancer at age 40.  She underwent a benign right breast biopsy 1997.  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  There was some surrounding DCIS.  She then underwent a formal partial mastectomy on December 23, 2004 showing some residual DCIS intermediate nuclear grade but free margins and she had 11 negative lymph nodes.  The cancer was ER/KS positive and HER-2/chung negative.  This was a pathologic prognostic stage IA breast cancer.  She underwent chemotherapy with Dr. Anaya and had radiation therapy at [facility].  She was on tamoxifen and then Arimidex for a total of 5 years.  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  She was found to have some calcifications in her right breast upper outer quadrant and underwent a stereotactic core biopsy at [facility] on December 20, 2023 which showed benign fibrocystic change with benign calcifications.  She comes in for routine yearly follow-up and continues to get yearly mammography and ultrasound.
Left Deviation

## 2024-02-01 NOTE — H&P PST ADULT - HEMATOLOGY/LYMPHATICS
"Podiatry / Foot and Ankle Surgery Progress Note    February 1, 2024    Subject: Patient was seen for continued left foot pain.  Notes that she has been doing the stretches and doing shockwave therapy and wearing the inserts but she still has a dull ache.  She is wondering if there is anything else going on with the heel.    Objective:  Vitals: /72   Ht 1.727 m (5' 8\")   Wt 72.6 kg (160 lb)   BMI 24.33 kg/m        General:  Patient is alert and orientated.  NAD.    Dermatologic: Skin is intact to both lower extremities without significant lesions, rash or abrasion.  No paronychia or evidence of soft tissue infection is noted.     Vascular: DP & PT pulses are intact & regular bilaterally.  No significant edema or varicosities noted.  CFT and skin temperature is normal to both lower extremities.     Neurologic: Lower extremity sensation is intact to light touch.  No evidence of weakness or contracture in the lower extremities.  No evidence of neuropathy.     Musculoskeletal: Patient is ambulatory without assistive device or brace.  Decrease arch height. Pain on palpation of the left plantar medial heel.    Radiographs: Left foot x-ray- I have looked at and reviewed xrays personally -decreased calcaneal inclination angle.  Small plantar heel spur is noted.  No fractures are noted.     ASSESSMENT:    Left foot pain  Plantar fasciitis, left  Pes planus of left foot     Medical Decision Making/Plan:  Reviewed patient's chart in [facility].  Reviewed and discussed x-rays.  Discussed that the bone spur is a radiographic finding of plantar fasciitis.  I would recommend an MRI to make sure there are no plantar fascial tears or any soft tissue masses in the area that could still be contributing to patient's pain.  We will call or MyChart her with the results.  We discussed that if it is just plantar fasciitis we could try cortisone injection to see if this would help calm it down or we discussed surgically going in and " cutting the band.  She would like to hold off on surgery if possible.     All questions were answered to patient's satisfaction and she will call further questions or concerns..         Patient risk factor: Patient is at low risk for infection    Patrizia Shaver DPM, Podiatry/Foot and Ankle Surgery     details…

## 2024-04-22 NOTE — ED PROVIDER NOTE - PSYCHIATRIC NEGATIVE STATEMENT, MLM
From: Macy Heard  To: REVA WARREN  Sent: 4/20/2024 6:29 PM CDT  Subject: Gabi Schmidt doctor, I contacted my new pharmacy for my sexanda pen and [facility] [place] is telling me that it still needs an authorization. I don’t know if it’s from you or my insurance. Are you able to help me?    no known mental health issues.

## 2024-06-28 ENCOUNTER — APPOINTMENT (OUTPATIENT)
Dept: DERMATOLOGY | Facility: CLINIC | Age: 84
End: 2024-06-28
Payer: MEDICARE

## 2024-06-28 DIAGNOSIS — L21.9 SEBORRHEIC DERMATITIS, UNSPECIFIED: ICD-10-CM

## 2024-06-28 PROCEDURE — 99214 OFFICE O/P EST MOD 30 MIN: CPT

## 2024-07-20 ENCOUNTER — OUTPATIENT (OUTPATIENT)
Dept: OUTPATIENT SERVICES | Facility: HOSPITAL | Age: 84
LOS: 1 days | Discharge: HOME | End: 2024-07-20

## 2024-07-20 DIAGNOSIS — H26.9 UNSPECIFIED CATARACT: Chronic | ICD-10-CM

## 2024-07-20 DIAGNOSIS — Z98.890 OTHER SPECIFIED POSTPROCEDURAL STATES: Chronic | ICD-10-CM

## 2024-07-20 DIAGNOSIS — C64.9 MALIGNANT NEOPLASM OF UNSPECIFIED KIDNEY, EXCEPT RENAL PELVIS: ICD-10-CM

## 2024-07-20 DIAGNOSIS — Z90.5 ACQUIRED ABSENCE OF KIDNEY: Chronic | ICD-10-CM

## 2024-07-29 ENCOUNTER — NON-APPOINTMENT (OUTPATIENT)
Age: 84
End: 2024-07-29

## 2024-07-30 ENCOUNTER — RESULT REVIEW (OUTPATIENT)
Age: 84
End: 2024-07-30

## 2024-07-30 ENCOUNTER — APPOINTMENT (OUTPATIENT)
Dept: HEMATOLOGY ONCOLOGY | Facility: CLINIC | Age: 84
End: 2024-07-30
Payer: MEDICARE

## 2024-07-30 VITALS
SYSTOLIC BLOOD PRESSURE: 125 MMHG | DIASTOLIC BLOOD PRESSURE: 76 MMHG | WEIGHT: 218.46 LBS | TEMPERATURE: 97.5 F | RESPIRATION RATE: 16 BRPM | HEIGHT: 67 IN | BODY MASS INDEX: 34.29 KG/M2 | HEART RATE: 71 BPM | OXYGEN SATURATION: 97 %

## 2024-07-30 DIAGNOSIS — C64.9 MALIGNANT NEOPLASM OF UNSPECIFIED KIDNEY, EXCEPT RENAL PELVIS: ICD-10-CM

## 2024-07-30 LAB
ALBUMIN SERPL ELPH-MCNC: 4.8 G/DL — SIGNIFICANT CHANGE UP (ref 3.3–5)
ALP SERPL-CCNC: 50 U/L — SIGNIFICANT CHANGE UP (ref 40–120)
ALT FLD-CCNC: 14 U/L — SIGNIFICANT CHANGE UP (ref 10–45)
ANION GAP SERPL CALC-SCNC: 14 MMOL/L — SIGNIFICANT CHANGE UP (ref 5–17)
AST SERPL-CCNC: 16 U/L — SIGNIFICANT CHANGE UP (ref 10–40)
BASOPHILS # BLD AUTO: 0.06 K/UL — SIGNIFICANT CHANGE UP (ref 0–0.2)
BASOPHILS NFR BLD AUTO: 0.9 % — SIGNIFICANT CHANGE UP (ref 0–2)
BILIRUB SERPL-MCNC: 1 MG/DL — SIGNIFICANT CHANGE UP (ref 0.2–1.2)
BUN SERPL-MCNC: 32 MG/DL — HIGH (ref 7–23)
CALCIUM SERPL-MCNC: 10.5 MG/DL — SIGNIFICANT CHANGE UP (ref 8.4–10.5)
CHLORIDE SERPL-SCNC: 105 MMOL/L — SIGNIFICANT CHANGE UP (ref 96–108)
CO2 SERPL-SCNC: 24 MMOL/L — SIGNIFICANT CHANGE UP (ref 22–31)
CREAT SERPL-MCNC: 1.85 MG/DL — HIGH (ref 0.5–1.3)
EGFR: 36 ML/MIN/1.73M2 — LOW
EOSINOPHIL # BLD AUTO: 0.6 K/UL — HIGH (ref 0–0.5)
EOSINOPHIL NFR BLD AUTO: 9.5 % — HIGH (ref 0–6)
GLUCOSE SERPL-MCNC: 114 MG/DL — HIGH (ref 70–99)
HCT VFR BLD CALC: 47.1 % — SIGNIFICANT CHANGE UP (ref 39–50)
HGB BLD-MCNC: 15.9 G/DL — SIGNIFICANT CHANGE UP (ref 13–17)
IMM GRANULOCYTES NFR BLD AUTO: 0.5 % — SIGNIFICANT CHANGE UP (ref 0–0.9)
LDH SERPL L TO P-CCNC: 191 U/L — SIGNIFICANT CHANGE UP (ref 50–242)
LYMPHOCYTES # BLD AUTO: 1.35 K/UL — SIGNIFICANT CHANGE UP (ref 1–3.3)
LYMPHOCYTES # BLD AUTO: 21.4 % — SIGNIFICANT CHANGE UP (ref 13–44)
MCHC RBC-ENTMCNC: 31 PG — SIGNIFICANT CHANGE UP (ref 27–34)
MCHC RBC-ENTMCNC: 33.8 G/DL — SIGNIFICANT CHANGE UP (ref 32–36)
MCV RBC AUTO: 91.8 FL — SIGNIFICANT CHANGE UP (ref 80–100)
MONOCYTES # BLD AUTO: 0.7 K/UL — SIGNIFICANT CHANGE UP (ref 0–0.9)
MONOCYTES NFR BLD AUTO: 11.1 % — SIGNIFICANT CHANGE UP (ref 2–14)
NEUTROPHILS # BLD AUTO: 3.58 K/UL — SIGNIFICANT CHANGE UP (ref 1.8–7.4)
NEUTROPHILS NFR BLD AUTO: 56.6 % — SIGNIFICANT CHANGE UP (ref 43–77)
NRBC # BLD: 0 /100 WBCS — SIGNIFICANT CHANGE UP (ref 0–0)
PLATELET # BLD AUTO: 143 K/UL — LOW (ref 150–400)
POTASSIUM SERPL-MCNC: 4.4 MMOL/L — SIGNIFICANT CHANGE UP (ref 3.5–5.3)
POTASSIUM SERPL-SCNC: 4.4 MMOL/L — SIGNIFICANT CHANGE UP (ref 3.5–5.3)
PROT SERPL-MCNC: 6.7 G/DL — SIGNIFICANT CHANGE UP (ref 6–8.3)
RBC # BLD: 5.13 M/UL — SIGNIFICANT CHANGE UP (ref 4.2–5.8)
RBC # FLD: 13.2 % — SIGNIFICANT CHANGE UP (ref 10.3–14.5)
SODIUM SERPL-SCNC: 143 MMOL/L — SIGNIFICANT CHANGE UP (ref 135–145)
WBC # BLD: 6.32 K/UL — SIGNIFICANT CHANGE UP (ref 3.8–10.5)
WBC # FLD AUTO: 6.32 K/UL — SIGNIFICANT CHANGE UP (ref 3.8–10.5)

## 2024-07-30 PROCEDURE — 99213 OFFICE O/P EST LOW 20 MIN: CPT

## 2024-07-30 NOTE — BEGINNING OF VISIT
[0] : 2) Feeling down, depressed, or hopeless: Not at all (0) [Advised Primary Care Follow-up] : Advised Primary Care Follow-up  [[id number]] : 0 [Pain Scale: ___] : On a scale of 1-10, today the patient's pain is a(n) [unfilled]. [Future Reassessment of Pain Scale] : Future reassessment of pain scale [Never] : Never [Date Discussed (MM/DD/YY): ___] : Discussed: [unfilled] [With Patient/Caregiver] : with Patient/Caregiver

## 2024-07-30 NOTE — BEGINNING OF VISIT
[0] : 2) Feeling down, depressed, or hopeless: Not at all (0) [Advised Primary Care Follow-up] : Advised Primary Care Follow-up  [[id number]] : 0 [Pain Scale: ___] : On a scale of 1-10, today the patient's pain is a(n) [unfilled]. [Future Reassessment of Pain Scale] : Future reassessment of pain scale [Never] : Never [Date Discussed (MM/DD/YY): ___] : Discussed: [unfilled] [With Patient/Caregiver] : with Patient/Caregiver

## 2024-07-30 NOTE — BEGINNING OF VISIT
[0] : 2) Feeling down, depressed, or hopeless: Not at all (0) [Advised Primary Care Follow-up] : Advised Primary Care Follow-up  [[id number]] : 0 [Pain Scale: ___] : On a scale of 1-10, today the patient's pain is a(n) [unfilled]. [Future Reassessment of Pain Scale] : Future reassessment of pain scale [Never] : Never [Date Discussed (MM/DD/YY): ___] : Discussed: [unfilled] [With Patient/Caregiver] : with Patient/Caregiver

## 2024-07-30 NOTE — BEGINNING OF VISIT
[0] : 2) Feeling down, depressed, or hopeless: Not at all (0) [Advised Primary Care Follow-up] : Advised Primary Care Follow-up  [[id number]] : 0 [Pain Scale: ___] : On a scale of 1-10, today the patient's pain is a(n) [unfilled]. [Future Reassessment of Pain Scale] : Future reassessment of pain scale [Never] : Never [Date Discussed (MM/DD/YY): ___] : Discussed: [unfilled] [With Patient/Caregiver] : with Patient/Caregiver

## 2024-07-31 NOTE — PHYSICAL EXAM
[Restricted in physically strenuous activity but ambulatory and able to carry out work of a light or sedentary nature] : Status 1- Restricted in physically strenuous activity but ambulatory and able to carry out work of a light or sedentary nature, e.g., light house work, office work [Normal] : affect appropriate [de-identified] : SATYA [de-identified] : bilateral Clement swelling at the level of lower legs

## 2024-07-31 NOTE — HISTORY OF PRESENT ILLNESS
[Disease: _____________________] : Disease: [unfilled] [T: ___] : T[unfilled] [N: ___] : N[unfilled] [M: ___] : M[unfilled] [AJCC Stage: ____] : AJCC Stage: [unfilled] [de-identified] : First seen by me October of 2013, this 74 year old man presented with gross hematuria and then underwent a CT scan.  He had a 25-pound weight loss before the diagnosis and he was found to be anemic.  A renal mass was found on the right side and he underwent resection complicated by a postoperative bleeding episode. This was a pathologic T3NXM0 tumor.  He was hospitalized from 08/27/13 via the emergency room until 09/17/13.  He was transfused 7 units of blood and he went back to the operating room on two occasions for removal of packing.  The pathology report is dated 08/29/13.  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  Tumor extended into the renal sinus fat and the adrenal gland was negative.  Margins were negative for invasive carcinoma.  No lymph nodes were submitted.  The tumor was pathologic T3aN0 and presumed M0.    2/12/16....Feels well, no fatigue. Occasional pains in right side, present since the surgery. Notes a bulge in the area. Appetite is good, no weight loss. No dyspnea, cough/sputum. No blood in urine.   8/24/16...Feels well. Still  has some discomfort in the right flank area, present since the surgery. Intermittent tightness and uncomfortable sensation, mostly noted when he lies on his right side. Appetite good, no weight loss. No bone pains. No N/V/D/C. No respiratory issues. Has sleep apnea, uses CPAP.   7/10/18...Not seen in almost 2 years. Had recent CT. Feels well. No pains noted. Has chronic discomfort in the surgical area. Has some arthritic and intermittent low back pains for a few years. Appetite is normal, weight stable. No headaches, no dizziness, no balance issues, no falls. No visual disturbances. No leg edema except for small amount in AM. No bone pains. No cough. Some dyspnea when he bends over. Not with exertion.   7/30/19...Here for follow up for T3 RCC 2013. Fells well. No headaches, no dizziness, no balance issues. Appetite is good, no weight loss. Minor fatigue. No hematuria. Has some muscle discomfort after the surgery. Some finer arthritis, no bone pains.   6/4/20 - Verbal permission granted for telehealth services by the patient, Micky Douglas, on 6/4/20  at 10:24 AM.  Feels well, follow up for RCC. Appetite is good, no weight loss. Minor arthritic complaints. No major pains. No cough, No SOB. No F/C. No headaches, no balance issues, no dizziness. Fatigue is mild. Walks around the neighborhood. Urine flow is good. NOcturia x 1. No blood in urine. No edema.   7/21/21 - 2013, pT3aN0, grade 2, on surveillance. Feels "good". No pains  other than some arthritic complaints mostly of the knees. Appetite is good, no weight loss. No cough, no CAMPOS. NO headaches, no dizziness, no balance issues. NO visual changes. Occ edema at end of the day. No chest pains, no chest pressure. No palpitations. had CV vaccine. No fatigue.   8/2/22...2013, pT3aN0, grade 2, on surveillance. feels "okay". IN January, developed a "persistent" mild headache, saw PCP. He also had aches and weakness in quadriceps, with fatigue. He had a high ESR, 50-80, started prednisone as there was a wait to see a rheumatologist.  He had a head CT scan, was negative. Still has the headaches, rheum though that TA was unlikely and prednisone was stopped, Saw a neurologist, had MRI/MRA of brain. He says it was inconclusive, Had a second opinion from another  rheumatologist. ESR was 110. Had MR of lumbar and cervical spine and temporal sonogram.  On comprehensive metabolic panel his glucose was 85.  The BUN was 19 with a creatinine of 1.6.  Electrolytes were normal.  The calcium was 9.6.  Protein and albumin were normal.  Alkaline phosphatase was normal at 68.  AST and AST were low.  He was checked for Anaplasma which was negative.  Serum protein electrophoresis was performed and was found to be consistent with an acute inflammatory pattern.  There were no monoclonal proteins detected.  Kappa lambda light chains had a ratio of 1.04.  Creatinine kinase was normal.  Lyme titers were negative.  An HAYDEE was low positive as screening.  An antinuclear antibody was 1-40 titer which is borderline.  Hepatitis serology was negative.  Rheumatoid factor was normal.  C-reactive protein was notably elevated at 54.5.  Marie-1 antibody and SM and SM/RNP antibodies were negative.  Sjogren studies were negative.  Lyme titers were negative.  Babesia titers were negative.  His white blood cell count was 9.3 with a hemoglobin value of 11.8 g and hematocrit of 34.5%.  The MCV and MCH were normal.  The platelet count was 400,000.  The differential was normal.  The ESR was 110.  These were from June 22. No cough. has CAMPOS for years without any clear etiology, has stress echo, PFTs. Recently had a carotid sono, no issues. The headaches are mild, top of head, radiating to back of skull, like a scalp aches. It was better on the prednisone. (20 mg). Had fever knee pains on prednisone. No visual abnormalities, no N/V,, no photophobia, scotomata. No N/V/D, mild constipation present. Appetite is good, no weight loss of note. NO night sweats. No edema. No chest pain/pressure/palpitations. Urine flow is good, has OAB, nocturia x 1, some urgency, minor incontinence. NO blood, no dysuria. More anemic.. Has fatigue, present for last year. Back pain when standing for a time, bending forwards, present  for years.   8/16/23 ....patient is here for his yearly surveillance. He was diagnosed with pT3aN0, grade 2 RCC, in 2013. Reports over the last year he was diagnosed with temporal arthritis/GCA and treated with high dose prednisone, now on Tocilizumab monthly. Reports HA as much improved with the treatment however still has been having some mild HA. He was also noted to have worsening leg swelling and was found to have pulmonary HTN. He could not tolerate Lasix due to worsening SIDRA. Otherwise, he reports to workout daily and eat healthier. No pains, no dizziness or balance issues. No fatigue. [de-identified] : FG 2, clear cell [de-identified] : 7/30/24 ....patient is here for his yearly surveillance. He was diagnosed with pT3aN0, grade 2 RCC, in 2013. Reports over the last year he was diagnosed with temporal arteritis/GCA and treated with high dose prednisone, now on Tocilizumab monthly. Reports HA as much improved with the treatment however still has been having some mild HA. He was also noted to have worsening leg swelling and was found to have pulmonary HTN. He could not tolerate Lasix due to worsening SIDRA. Otherwise, he reports to workout daily and eat healthier. No pains, no dizziness or balance issues. admits mild fatigue at the end of day. Good appetite. He denies changes with urination and bowel movements.

## 2024-07-31 NOTE — PHYSICAL EXAM
[Restricted in physically strenuous activity but ambulatory and able to carry out work of a light or sedentary nature] : Status 1- Restricted in physically strenuous activity but ambulatory and able to carry out work of a light or sedentary nature, e.g., light house work, office work [Normal] : affect appropriate [de-identified] : SATYA [de-identified] : bilateral Clement swelling at the level of lower legs

## 2024-07-31 NOTE — ASSESSMENT
[Palliative Care Plan] : not applicable at this time [FreeTextEntry1] : Micky Douglas is seen in the office today in follow-up.  In 2013 he had a pathologic T3a grade 2 renal cell carcinoma resected.    In January 2022 he developed a "persistent" mild headache.  He saw his primary care doctor.  The headache extends over the dome of the skull and towards the temporal regions.  He also had aches and weakness in his quadriceps with fatigue.  He was found to have a high sedimentation rate on 1 occasion in the 50s and another occasion in the 80s.  He was started on prednisone as there was a wait to see a rheumatologist.  The diagnosis was the possibility of temporal arteritis.  He had a head CT scan which was negative.  He continues to have the headaches.  He saw a rheumatologist and they thought that temporal arteritis was unlikely and prednisone was stopped.  He was then seen by a neurologist and an MRI and MRA of the brain was performed which she said did not show any abnormality.  He went to a second rheumatologist for another opinion.  His sedimentation rate was found to be 110.  The CRP was 57.  An MRI of the lumbar and cervical spine was performed as well as a temporal sonogram.  On the comprehensive metabolic panel, and his glucose was 85.  The BUN was 19 with a creatinine of 1.6.  Electrolytes were normal as was the calcium.  There was no protein gap.  The alkaline phosphatase was normal and the transaminases were low.  He was checked for Anaplasma which was negative.  An SPEP was performed and found to be consistent with an acute inflammatory pattern.  There were no monoclonal proteins detected.  Creatinine kinase was normal.  Lyme titers were negative.  An HAYDEE was low positive as a screening test.  An antinuclear antibody was 1-40.  Hepatitis serology was negative.  Rheumatoid factor was negative.  The C-reactive protein was notably elevated at 54.5.  Marie-1 antibody as well as SM and SM/RNP antibodies were negative.  Sjogren studies were negative.  Lyme titers were negative.  Babesia titers were negative.  His white blood cell count was 9.3 with a hemoglobin value of 11.8 and hematocrit of 34.5%.  He usually has normal to higher hemoglobin values.  These labs were from June 22 2022.  Since then he has been following with Rheumatology for GCA/TA, and he is receiving tocilizumab monthly.  Last CT C/A/P in July 2023 with no new concerning findings.   During the visit on 7/30/24, We discussed with pt that for now will monitor clinically and may consider CT surveillance only if necessary. Pt agrees with the plan.   RTC in one year. All questions were answered to the best my ability and to his apparent satisfaction.  Case was seen and discussed with Dr. Dashawn Barber PGY6 hem&onc fellow  Attending note: The patient was initially evaluated by Dr. Barber, oncology fellow.  After discussion of his findings, I entered the room and went over elements of the history and performed a focused physical examination.  We discussed about monitoring at this time.  He has indolent renal cell carcinoma as evidenced by his prolonged time to presentation of metastases.  We will hold off on any imaging at this time.  I agree with the assessment and plan as stated above by the oncology fellow, Issac Tamez MD

## 2024-07-31 NOTE — HISTORY OF PRESENT ILLNESS
[Disease: _____________________] : Disease: [unfilled] [T: ___] : T[unfilled] [N: ___] : N[unfilled] [M: ___] : M[unfilled] [AJCC Stage: ____] : AJCC Stage: [unfilled] [de-identified] : First seen by me October of 2013, this 74 year old man presented with gross hematuria and then underwent a CT scan.  He had a 25-pound weight loss before the diagnosis and he was found to be anemic.  A renal mass was found on the right side and he underwent resection complicated by a postoperative bleeding episode. This was a pathologic T3NXM0 tumor.  He was hospitalized from 08/27/13 via the emergency room until 09/17/13.  He was transfused 7 units of blood and he went back to the operating room on two occasions for removal of packing.  The pathology report is dated 08/29/13.  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  Tumor extended into the renal sinus fat and the adrenal gland was negative.  Margins were negative for invasive carcinoma.  No lymph nodes were submitted.  The tumor was pathologic T3aN0 and presumed M0.    2/12/16....Feels well, no fatigue. Occasional pains in right side, present since the surgery. Notes a bulge in the area. Appetite is good, no weight loss. No dyspnea, cough/sputum. No blood in urine.   8/24/16...Feels well. Still  has some discomfort in the right flank area, present since the surgery. Intermittent tightness and uncomfortable sensation, mostly noted when he lies on his right side. Appetite good, no weight loss. No bone pains. No N/V/D/C. No respiratory issues. Has sleep apnea, uses CPAP.   7/10/18...Not seen in almost 2 years. Had recent CT. Feels well. No pains noted. Has chronic discomfort in the surgical area. Has some arthritic and intermittent low back pains for a few years. Appetite is normal, weight stable. No headaches, no dizziness, no balance issues, no falls. No visual disturbances. No leg edema except for small amount in AM. No bone pains. No cough. Some dyspnea when he bends over. Not with exertion.   7/30/19...Here for follow up for T3 RCC 2013. Fells well. No headaches, no dizziness, no balance issues. Appetite is good, no weight loss. Minor fatigue. No hematuria. Has some muscle discomfort after the surgery. Some finer arthritis, no bone pains.   6/4/20 - Verbal permission granted for telehealth services by the patient, Micky Douglas, on 6/4/20  at 10:24 AM.  Feels well, follow up for RCC. Appetite is good, no weight loss. Minor arthritic complaints. No major pains. No cough, No SOB. No F/C. No headaches, no balance issues, no dizziness. Fatigue is mild. Walks around the neighborhood. Urine flow is good. NOcturia x 1. No blood in urine. No edema.   7/21/21 - 2013, pT3aN0, grade 2, on surveillance. Feels "good". No pains  other than some arthritic complaints mostly of the knees. Appetite is good, no weight loss. No cough, no CAMPOS. NO headaches, no dizziness, no balance issues. NO visual changes. Occ edema at end of the day. No chest pains, no chest pressure. No palpitations. had CV vaccine. No fatigue.   8/2/22...2013, pT3aN0, grade 2, on surveillance. feels "okay". IN January, developed a "persistent" mild headache, saw PCP. He also had aches and weakness in quadriceps, with fatigue. He had a high ESR, 50-80, started prednisone as there was a wait to see a rheumatologist.  He had a head CT scan, was negative. Still has the headaches, rheum though that TA was unlikely and prednisone was stopped, Saw a neurologist, had MRI/MRA of brain. He says it was inconclusive, Had a second opinion from another  rheumatologist. ESR was 110. Had MR of lumbar and cervical spine and temporal sonogram.  On comprehensive metabolic panel his glucose was 85.  The BUN was 19 with a creatinine of 1.6.  Electrolytes were normal.  The calcium was 9.6.  Protein and albumin were normal.  Alkaline phosphatase was normal at 68.  AST and AST were low.  He was checked for Anaplasma which was negative.  Serum protein electrophoresis was performed and was found to be consistent with an acute inflammatory pattern.  There were no monoclonal proteins detected.  Kappa lambda light chains had a ratio of 1.04.  Creatinine kinase was normal.  Lyme titers were negative.  An HAYDEE was low positive as screening.  An antinuclear antibody was 1-40 titer which is borderline.  Hepatitis serology was negative.  Rheumatoid factor was normal.  C-reactive protein was notably elevated at 54.5.  Marie-1 antibody and SM and SM/RNP antibodies were negative.  Sjogren studies were negative.  Lyme titers were negative.  Babesia titers were negative.  His white blood cell count was 9.3 with a hemoglobin value of 11.8 g and hematocrit of 34.5%.  The MCV and MCH were normal.  The platelet count was 400,000.  The differential was normal.  The ESR was 110.  These were from June 22. No cough. has CAMPOS for years without any clear etiology, has stress echo, PFTs. Recently had a carotid sono, no issues. The headaches are mild, top of head, radiating to back of skull, like a scalp aches. It was better on the prednisone. (20 mg). Had fever knee pains on prednisone. No visual abnormalities, no N/V,, no photophobia, scotomata. No N/V/D, mild constipation present. Appetite is good, no weight loss of note. NO night sweats. No edema. No chest pain/pressure/palpitations. Urine flow is good, has OAB, nocturia x 1, some urgency, minor incontinence. NO blood, no dysuria. More anemic.. Has fatigue, present for last year. Back pain when standing for a time, bending forwards, present  for years.   8/16/23 ....patient is here for his yearly surveillance. He was diagnosed with pT3aN0, grade 2 RCC, in 2013. Reports over the last year he was diagnosed with temporal arthritis/GCA and treated with high dose prednisone, now on Tocilizumab monthly. Reports HA as much improved with the treatment however still has been having some mild HA. He was also noted to have worsening leg swelling and was found to have pulmonary HTN. He could not tolerate Lasix due to worsening SIDRA. Otherwise, he reports to workout daily and eat healthier. No pains, no dizziness or balance issues. No fatigue. [de-identified] : FG 2, clear cell [de-identified] : 7/30/24 ....patient is here for his yearly surveillance. He was diagnosed with pT3aN0, grade 2 RCC, in 2013. Reports over the last year he was diagnosed with temporal arteritis/GCA and treated with high dose prednisone, now on Tocilizumab monthly. Reports HA as much improved with the treatment however still has been having some mild HA. He was also noted to have worsening leg swelling and was found to have pulmonary HTN. He could not tolerate Lasix due to worsening SIDRA. Otherwise, he reports to workout daily and eat healthier. No pains, no dizziness or balance issues. admits mild fatigue at the end of day. Good appetite. He denies changes with urination and bowel movements.

## 2024-07-31 NOTE — HISTORY OF PRESENT ILLNESS
[Disease: _____________________] : Disease: [unfilled] [T: ___] : T[unfilled] [N: ___] : N[unfilled] [M: ___] : M[unfilled] [AJCC Stage: ____] : AJCC Stage: [unfilled] [de-identified] : First seen by me October of 2013, this 74 year old man presented with gross hematuria and then underwent a CT scan.  He had a 25-pound weight loss before the diagnosis and he was found to be anemic.  A renal mass was found on the right side and he underwent resection complicated by a postoperative bleeding episode. This was a pathologic T3NXM0 tumor.  He was hospitalized from 08/27/13 via the emergency room until 09/17/13.  He was transfused 7 units of blood and he went back to the operating room on two occasions for removal of packing.  The pathology report is dated 08/29/13.  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  Tumor extended into the renal sinus fat and the adrenal gland was negative.  Margins were negative for invasive carcinoma.  No lymph nodes were submitted.  The tumor was pathologic T3aN0 and presumed M0.    2/12/16....Feels well, no fatigue. Occasional pains in right side, present since the surgery. Notes a bulge in the area. Appetite is good, no weight loss. No dyspnea, cough/sputum. No blood in urine.   8/24/16...Feels well. Still  has some discomfort in the right flank area, present since the surgery. Intermittent tightness and uncomfortable sensation, mostly noted when he lies on his right side. Appetite good, no weight loss. No bone pains. No N/V/D/C. No respiratory issues. Has sleep apnea, uses CPAP.   7/10/18...Not seen in almost 2 years. Had recent CT. Feels well. No pains noted. Has chronic discomfort in the surgical area. Has some arthritic and intermittent low back pains for a few years. Appetite is normal, weight stable. No headaches, no dizziness, no balance issues, no falls. No visual disturbances. No leg edema except for small amount in AM. No bone pains. No cough. Some dyspnea when he bends over. Not with exertion.   7/30/19...Here for follow up for T3 RCC 2013. Fells well. No headaches, no dizziness, no balance issues. Appetite is good, no weight loss. Minor fatigue. No hematuria. Has some muscle discomfort after the surgery. Some finer arthritis, no bone pains.   6/4/20 - Verbal permission granted for telehealth services by the patient, Micky Douglas, on 6/4/20  at 10:24 AM.  Feels well, follow up for RCC. Appetite is good, no weight loss. Minor arthritic complaints. No major pains. No cough, No SOB. No F/C. No headaches, no balance issues, no dizziness. Fatigue is mild. Walks around the neighborhood. Urine flow is good. NOcturia x 1. No blood in urine. No edema.   7/21/21 - 2013, pT3aN0, grade 2, on surveillance. Feels "good". No pains  other than some arthritic complaints mostly of the knees. Appetite is good, no weight loss. No cough, no CAMPOS. NO headaches, no dizziness, no balance issues. NO visual changes. Occ edema at end of the day. No chest pains, no chest pressure. No palpitations. had CV vaccine. No fatigue.   8/2/22...2013, pT3aN0, grade 2, on surveillance. feels "okay". IN January, developed a "persistent" mild headache, saw PCP. He also had aches and weakness in quadriceps, with fatigue. He had a high ESR, 50-80, started prednisone as there was a wait to see a rheumatologist.  He had a head CT scan, was negative. Still has the headaches, rheum though that TA was unlikely and prednisone was stopped, Saw a neurologist, had MRI/MRA of brain. He says it was inconclusive, Had a second opinion from another  rheumatologist. ESR was 110. Had MR of lumbar and cervical spine and temporal sonogram.  On comprehensive metabolic panel his glucose was 85.  The BUN was 19 with a creatinine of 1.6.  Electrolytes were normal.  The calcium was 9.6.  Protein and albumin were normal.  Alkaline phosphatase was normal at 68.  AST and AST were low.  He was checked for Anaplasma which was negative.  Serum protein electrophoresis was performed and was found to be consistent with an acute inflammatory pattern.  There were no monoclonal proteins detected.  Kappa lambda light chains had a ratio of 1.04.  Creatinine kinase was normal.  Lyme titers were negative.  An HAYDEE was low positive as screening.  An antinuclear antibody was 1-40 titer which is borderline.  Hepatitis serology was negative.  Rheumatoid factor was normal.  C-reactive protein was notably elevated at 54.5.  Marie-1 antibody and SM and SM/RNP antibodies were negative.  Sjogren studies were negative.  Lyme titers were negative.  Babesia titers were negative.  His white blood cell count was 9.3 with a hemoglobin value of 11.8 g and hematocrit of 34.5%.  The MCV and MCH were normal.  The platelet count was 400,000.  The differential was normal.  The ESR was 110.  These were from June 22. No cough. has CAMPOS for years without any clear etiology, has stress echo, PFTs. Recently had a carotid sono, no issues. The headaches are mild, top of head, radiating to back of skull, like a scalp aches. It was better on the prednisone. (20 mg). Had fever knee pains on prednisone. No visual abnormalities, no N/V,, no photophobia, scotomata. No N/V/D, mild constipation present. Appetite is good, no weight loss of note. NO night sweats. No edema. No chest pain/pressure/palpitations. Urine flow is good, has OAB, nocturia x 1, some urgency, minor incontinence. NO blood, no dysuria. More anemic.. Has fatigue, present for last year. Back pain when standing for a time, bending forwards, present  for years.   8/16/23 ....patient is here for his yearly surveillance. He was diagnosed with pT3aN0, grade 2 RCC, in 2013. Reports over the last year he was diagnosed with temporal arthritis/GCA and treated with high dose prednisone, now on Tocilizumab monthly. Reports HA as much improved with the treatment however still has been having some mild HA. He was also noted to have worsening leg swelling and was found to have pulmonary HTN. He could not tolerate Lasix due to worsening SIDRA. Otherwise, he reports to workout daily and eat healthier. No pains, no dizziness or balance issues. No fatigue. [de-identified] : FG 2, clear cell [de-identified] : 7/30/24 ....patient is here for his yearly surveillance. He was diagnosed with pT3aN0, grade 2 RCC, in 2013. Reports over the last year he was diagnosed with temporal arteritis/GCA and treated with high dose prednisone, now on Tocilizumab monthly. Reports HA as much improved with the treatment however still has been having some mild HA. He was also noted to have worsening leg swelling and was found to have pulmonary HTN. He could not tolerate Lasix due to worsening SIDRA. Otherwise, he reports to workout daily and eat healthier. No pains, no dizziness or balance issues. admits mild fatigue at the end of day. Good appetite. He denies changes with urination and bowel movements.

## 2024-07-31 NOTE — REVIEW OF SYSTEMS
[Fatigue] : fatigue [Recent Change In Weight] : ~T recent weight change [Lower Ext Edema] : lower extremity edema [SOB on Exertion] : shortness of breath during exertion [Diarrhea: Grade 0] : Diarrhea: Grade 0 [Muscle Weakness] : muscle weakness [Negative] : Allergic/Immunologic [Fever] : no fever [Chills] : no chills [Night Sweats] : no night sweats [Eye Pain] : no eye pain [Vision Problems] : no vision problems [Leg Claudication] : no intermittent leg claudication [Shortness Of Breath] : no shortness of breath [Wheezing] : no wheezing [Cough] : no cough [Abdominal Pain] : no abdominal pain [Vomiting] : no vomiting [Constipation] : no constipation [Dysuria] : no dysuria [Incontinence] : no incontinence [Joint Pain] : no joint pain [Joint Stiffness] : no joint stiffness [Muscle Pain] : no muscle pain

## 2024-07-31 NOTE — HISTORY OF PRESENT ILLNESS
[Disease: _____________________] : Disease: [unfilled] [T: ___] : T[unfilled] [N: ___] : N[unfilled] [M: ___] : M[unfilled] [AJCC Stage: ____] : AJCC Stage: [unfilled] [de-identified] : First seen by me October of 2013, this 74 year old man presented with gross hematuria and then underwent a CT scan.  He had a 25-pound weight loss before the diagnosis and he was found to be anemic.  A renal mass was found on the right side and he underwent resection complicated by a postoperative bleeding episode. This was a pathologic T3NXM0 tumor.  He was hospitalized from 08/27/13 via the emergency room until 09/17/13.  He was transfused 7 units of blood and he went back to the operating room on two occasions for removal of packing.  The pathology report is dated 08/29/13.  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  Tumor extended into the renal sinus fat and the adrenal gland was negative.  Margins were negative for invasive carcinoma.  No lymph nodes were submitted.  The tumor was pathologic T3aN0 and presumed M0.    2/12/16....Feels well, no fatigue. Occasional pains in right side, present since the surgery. Notes a bulge in the area. Appetite is good, no weight loss. No dyspnea, cough/sputum. No blood in urine.   8/24/16...Feels well. Still  has some discomfort in the right flank area, present since the surgery. Intermittent tightness and uncomfortable sensation, mostly noted when he lies on his right side. Appetite good, no weight loss. No bone pains. No N/V/D/C. No respiratory issues. Has sleep apnea, uses CPAP.   7/10/18...Not seen in almost 2 years. Had recent CT. Feels well. No pains noted. Has chronic discomfort in the surgical area. Has some arthritic and intermittent low back pains for a few years. Appetite is normal, weight stable. No headaches, no dizziness, no balance issues, no falls. No visual disturbances. No leg edema except for small amount in AM. No bone pains. No cough. Some dyspnea when he bends over. Not with exertion.   7/30/19...Here for follow up for T3 RCC 2013. Fells well. No headaches, no dizziness, no balance issues. Appetite is good, no weight loss. Minor fatigue. No hematuria. Has some muscle discomfort after the surgery. Some finer arthritis, no bone pains.   6/4/20 - Verbal permission granted for telehealth services by the patient, Micky Douglas, on 6/4/20  at 10:24 AM.  Feels well, follow up for RCC. Appetite is good, no weight loss. Minor arthritic complaints. No major pains. No cough, No SOB. No F/C. No headaches, no balance issues, no dizziness. Fatigue is mild. Walks around the neighborhood. Urine flow is good. NOcturia x 1. No blood in urine. No edema.   7/21/21 - 2013, pT3aN0, grade 2, on surveillance. Feels "good". No pains  other than some arthritic complaints mostly of the knees. Appetite is good, no weight loss. No cough, no CAMPOS. NO headaches, no dizziness, no balance issues. NO visual changes. Occ edema at end of the day. No chest pains, no chest pressure. No palpitations. had CV vaccine. No fatigue.   8/2/22...2013, pT3aN0, grade 2, on surveillance. feels "okay". IN January, developed a "persistent" mild headache, saw PCP. He also had aches and weakness in quadriceps, with fatigue. He had a high ESR, 50-80, started prednisone as there was a wait to see a rheumatologist.  He had a head CT scan, was negative. Still has the headaches, rheum though that TA was unlikely and prednisone was stopped, Saw a neurologist, had MRI/MRA of brain. He says it was inconclusive, Had a second opinion from another  rheumatologist. ESR was 110. Had MR of lumbar and cervical spine and temporal sonogram.  On comprehensive metabolic panel his glucose was 85.  The BUN was 19 with a creatinine of 1.6.  Electrolytes were normal.  The calcium was 9.6.  Protein and albumin were normal.  Alkaline phosphatase was normal at 68.  AST and AST were low.  He was checked for Anaplasma which was negative.  Serum protein electrophoresis was performed and was found to be consistent with an acute inflammatory pattern.  There were no monoclonal proteins detected.  Kappa lambda light chains had a ratio of 1.04.  Creatinine kinase was normal.  Lyme titers were negative.  An HAYDEE was low positive as screening.  An antinuclear antibody was 1-40 titer which is borderline.  Hepatitis serology was negative.  Rheumatoid factor was normal.  C-reactive protein was notably elevated at 54.5.  Marie-1 antibody and SM and SM/RNP antibodies were negative.  Sjogren studies were negative.  Lyme titers were negative.  Babesia titers were negative.  His white blood cell count was 9.3 with a hemoglobin value of 11.8 g and hematocrit of 34.5%.  The MCV and MCH were normal.  The platelet count was 400,000.  The differential was normal.  The ESR was 110.  These were from June 22. No cough. has CAMPOS for years without any clear etiology, has stress echo, PFTs. Recently had a carotid sono, no issues. The headaches are mild, top of head, radiating to back of skull, like a scalp aches. It was better on the prednisone. (20 mg). Had fever knee pains on prednisone. No visual abnormalities, no N/V,, no photophobia, scotomata. No N/V/D, mild constipation present. Appetite is good, no weight loss of note. NO night sweats. No edema. No chest pain/pressure/palpitations. Urine flow is good, has OAB, nocturia x 1, some urgency, minor incontinence. NO blood, no dysuria. More anemic.. Has fatigue, present for last year. Back pain when standing for a time, bending forwards, present  for years.   8/16/23 ....patient is here for his yearly surveillance. He was diagnosed with pT3aN0, grade 2 RCC, in 2013. Reports over the last year he was diagnosed with temporal arthritis/GCA and treated with high dose prednisone, now on Tocilizumab monthly. Reports HA as much improved with the treatment however still has been having some mild HA. He was also noted to have worsening leg swelling and was found to have pulmonary HTN. He could not tolerate Lasix due to worsening SIDRA. Otherwise, he reports to workout daily and eat healthier. No pains, no dizziness or balance issues. No fatigue. [de-identified] : FG 2, clear cell [de-identified] : 7/30/24 ....patient is here for his yearly surveillance. He was diagnosed with pT3aN0, grade 2 RCC, in 2013. Reports over the last year he was diagnosed with temporal arteritis/GCA and treated with high dose prednisone, now on Tocilizumab monthly. Reports HA as much improved with the treatment however still has been having some mild HA. He was also noted to have worsening leg swelling and was found to have pulmonary HTN. He could not tolerate Lasix due to worsening SIDRA. Otherwise, he reports to workout daily and eat healthier. No pains, no dizziness or balance issues. admits mild fatigue at the end of day. Good appetite. He denies changes with urination and bowel movements.

## 2024-07-31 NOTE — PHYSICAL EXAM
[Restricted in physically strenuous activity but ambulatory and able to carry out work of a light or sedentary nature] : Status 1- Restricted in physically strenuous activity but ambulatory and able to carry out work of a light or sedentary nature, e.g., light house work, office work [Normal] : affect appropriate [de-identified] : SATYA [de-identified] : bilateral Clement swelling at the level of lower legs

## 2024-07-31 NOTE — PHYSICAL EXAM
[Restricted in physically strenuous activity but ambulatory and able to carry out work of a light or sedentary nature] : Status 1- Restricted in physically strenuous activity but ambulatory and able to carry out work of a light or sedentary nature, e.g., light house work, office work [Normal] : affect appropriate [de-identified] : SATYA [de-identified] : bilateral Clement swelling at the level of lower legs

## 2024-12-07 ENCOUNTER — APPOINTMENT (OUTPATIENT)
Dept: DERMATOLOGY | Facility: CLINIC | Age: 84
End: 2024-12-07
Payer: MEDICARE

## 2024-12-07 VITALS — WEIGHT: 220 LBS | BODY MASS INDEX: 34.46 KG/M2

## 2024-12-07 DIAGNOSIS — L21.9 SEBORRHEIC DERMATITIS, UNSPECIFIED: ICD-10-CM

## 2024-12-07 DIAGNOSIS — L81.4 OTHER MELANIN HYPERPIGMENTATION: ICD-10-CM

## 2024-12-07 DIAGNOSIS — L82.1 OTHER SEBORRHEIC KERATOSIS: ICD-10-CM

## 2024-12-07 PROCEDURE — 99214 OFFICE O/P EST MOD 30 MIN: CPT

## 2025-05-16 ENCOUNTER — OUTPATIENT (OUTPATIENT)
Dept: OUTPATIENT SERVICES | Facility: HOSPITAL | Age: 85
LOS: 1 days | Discharge: HOME | End: 2025-05-16

## 2025-05-16 DIAGNOSIS — Z98.890 OTHER SPECIFIED POSTPROCEDURAL STATES: Chronic | ICD-10-CM

## 2025-05-16 DIAGNOSIS — Z90.5 ACQUIRED ABSENCE OF KIDNEY: Chronic | ICD-10-CM

## 2025-05-16 DIAGNOSIS — H26.9 UNSPECIFIED CATARACT: Chronic | ICD-10-CM

## 2025-05-16 DIAGNOSIS — C64.9 MALIGNANT NEOPLASM OF UNSPECIFIED KIDNEY, EXCEPT RENAL PELVIS: ICD-10-CM

## 2025-05-19 ENCOUNTER — NON-APPOINTMENT (OUTPATIENT)
Age: 85
End: 2025-05-19

## 2025-05-19 ENCOUNTER — APPOINTMENT (OUTPATIENT)
Dept: HEMATOLOGY ONCOLOGY | Facility: CLINIC | Age: 85
End: 2025-05-19
Payer: MEDICARE

## 2025-05-19 VITALS
TEMPERATURE: 97 F | RESPIRATION RATE: 18 BRPM | OXYGEN SATURATION: 97 % | DIASTOLIC BLOOD PRESSURE: 81 MMHG | WEIGHT: 222.66 LBS | BODY MASS INDEX: 34.95 KG/M2 | HEART RATE: 103 BPM | SYSTOLIC BLOOD PRESSURE: 139 MMHG | HEIGHT: 66.93 IN

## 2025-05-19 PROCEDURE — 99204 OFFICE O/P NEW MOD 45 MIN: CPT

## 2025-06-02 ENCOUNTER — RX RENEWAL (OUTPATIENT)
Age: 85
End: 2025-06-02

## 2025-07-15 ENCOUNTER — APPOINTMENT (OUTPATIENT)
Dept: NEUROLOGY | Facility: CLINIC | Age: 85
End: 2025-07-15
Payer: MEDICARE

## 2025-07-15 VITALS
DIASTOLIC BLOOD PRESSURE: 72 MMHG | RESPIRATION RATE: 15 BRPM | SYSTOLIC BLOOD PRESSURE: 149 MMHG | WEIGHT: 220 LBS | HEIGHT: 67 IN | BODY MASS INDEX: 34.53 KG/M2 | HEART RATE: 73 BPM

## 2025-07-15 PROBLEM — M48.02 DEGENERATIVE CERVICAL SPINAL STENOSIS: Status: ACTIVE | Noted: 2025-07-15

## 2025-07-15 PROCEDURE — 99205 OFFICE O/P NEW HI 60 MIN: CPT

## 2025-07-20 ENCOUNTER — OUTPATIENT (OUTPATIENT)
Dept: OUTPATIENT SERVICES | Facility: HOSPITAL | Age: 85
LOS: 1 days | End: 2025-07-20
Payer: MEDICARE

## 2025-07-20 DIAGNOSIS — M48.02 SPINAL STENOSIS, CERVICAL REGION: ICD-10-CM

## 2025-07-20 DIAGNOSIS — Z90.5 ACQUIRED ABSENCE OF KIDNEY: Chronic | ICD-10-CM

## 2025-07-20 DIAGNOSIS — H26.9 UNSPECIFIED CATARACT: Chronic | ICD-10-CM

## 2025-07-20 DIAGNOSIS — Z98.890 OTHER SPECIFIED POSTPROCEDURAL STATES: Chronic | ICD-10-CM

## 2025-07-20 DIAGNOSIS — R26.81 UNSTEADINESS ON FEET: ICD-10-CM

## 2025-07-20 PROCEDURE — 72141 MRI NECK SPINE W/O DYE: CPT

## 2025-07-20 PROCEDURE — 70551 MRI BRAIN STEM W/O DYE: CPT

## 2025-08-11 ENCOUNTER — APPOINTMENT (OUTPATIENT)
Dept: NEUROLOGY | Facility: CLINIC | Age: 85
End: 2025-08-11
Payer: MEDICARE

## 2025-08-11 VITALS
DIASTOLIC BLOOD PRESSURE: 78 MMHG | WEIGHT: 220 LBS | HEART RATE: 76 BPM | HEIGHT: 67 IN | SYSTOLIC BLOOD PRESSURE: 130 MMHG | BODY MASS INDEX: 34.53 KG/M2

## 2025-08-11 DIAGNOSIS — R26.81 UNSTEADINESS ON FEET: ICD-10-CM

## 2025-08-11 PROCEDURE — 99214 OFFICE O/P EST MOD 30 MIN: CPT

## 2025-08-12 ENCOUNTER — OUTPATIENT (OUTPATIENT)
Dept: OUTPATIENT SERVICES | Facility: HOSPITAL | Age: 85
LOS: 1 days | End: 2025-08-12
Payer: MEDICARE

## 2025-08-12 ENCOUNTER — APPOINTMENT (OUTPATIENT)
Dept: MRI IMAGING | Facility: CLINIC | Age: 85
End: 2025-08-12
Payer: MEDICARE

## 2025-08-12 DIAGNOSIS — H26.9 UNSPECIFIED CATARACT: Chronic | ICD-10-CM

## 2025-08-12 DIAGNOSIS — R26.81 UNSTEADINESS ON FEET: ICD-10-CM

## 2025-08-12 DIAGNOSIS — Z90.5 ACQUIRED ABSENCE OF KIDNEY: Chronic | ICD-10-CM

## 2025-08-12 DIAGNOSIS — Z98.890 OTHER SPECIFIED POSTPROCEDURAL STATES: Chronic | ICD-10-CM

## 2025-08-12 PROCEDURE — 72148 MRI LUMBAR SPINE W/O DYE: CPT

## 2025-08-12 PROCEDURE — 72148 MRI LUMBAR SPINE W/O DYE: CPT | Mod: 26
